# Patient Record
Sex: FEMALE | Race: WHITE | Employment: FULL TIME | URBAN - METROPOLITAN AREA
[De-identification: names, ages, dates, MRNs, and addresses within clinical notes are randomized per-mention and may not be internally consistent; named-entity substitution may affect disease eponyms.]

---

## 2017-04-13 RX ORDER — MINOCYCLINE HYDROCHLORIDE 100 MG/1
CAPSULE ORAL
Qty: 60 CAPSULE | Refills: 0 | Status: SHIPPED | OUTPATIENT
Start: 2017-04-13 | End: 2017-07-26 | Stop reason: SDUPTHER

## 2017-07-05 RX ORDER — ORPHENADRINE CITRATE 100 MG/1
TABLET, EXTENDED RELEASE ORAL
Qty: 30 TABLET | Refills: 0 | Status: SHIPPED | OUTPATIENT
Start: 2017-07-05 | End: 2017-12-04 | Stop reason: SDUPTHER

## 2017-07-26 RX ORDER — MINOCYCLINE HYDROCHLORIDE 100 MG/1
CAPSULE ORAL
Qty: 60 CAPSULE | Refills: 0 | Status: SHIPPED | OUTPATIENT
Start: 2017-07-26 | End: 2017-12-08 | Stop reason: SDUPTHER

## 2017-12-05 RX ORDER — ORPHENADRINE CITRATE 100 MG/1
TABLET, EXTENDED RELEASE ORAL
Qty: 30 TABLET | Refills: 0 | Status: SHIPPED | OUTPATIENT
Start: 2017-12-05 | End: 2017-12-22 | Stop reason: CLARIF

## 2017-12-07 DIAGNOSIS — M54.2 CERVICAL PAIN: ICD-10-CM

## 2017-12-07 RX ORDER — NAPROXEN 500 MG/1
500 TABLET ORAL 2 TIMES DAILY WITH MEALS
Qty: 60 TABLET | Refills: 2 | Status: SHIPPED | OUTPATIENT
Start: 2017-12-07 | End: 2020-10-01

## 2017-12-07 NOTE — TELEPHONE ENCOUNTER
12/07/2017   Izzy Hauser called requesting a refill on the following medications:  Requested Prescriptions     Pending Prescriptions Disp Refills    naproxen (NAPROSYN) 500 MG tablet 60 tablet 2     Sig: Take 1 tablet by mouth 2 times daily (with meals)     Pharmacy verified:  [email or web address]      Date of last visit: 03/31/2016  Date of next visit (if applicable): 56/47/0220

## 2017-12-08 RX ORDER — MINOCYCLINE HYDROCHLORIDE 100 MG/1
100 CAPSULE ORAL 2 TIMES DAILY
Qty: 60 CAPSULE | Refills: 0 | Status: SHIPPED | OUTPATIENT
Start: 2017-12-08 | End: 2018-05-15 | Stop reason: SDUPTHER

## 2017-12-22 ENCOUNTER — OFFICE VISIT (OUTPATIENT)
Dept: FAMILY MEDICINE CLINIC | Age: 42
End: 2017-12-22
Payer: COMMERCIAL

## 2017-12-22 VITALS
DIASTOLIC BLOOD PRESSURE: 86 MMHG | RESPIRATION RATE: 20 BRPM | BODY MASS INDEX: 35.02 KG/M2 | SYSTOLIC BLOOD PRESSURE: 126 MMHG | HEART RATE: 80 BPM | HEIGHT: 67 IN | WEIGHT: 223.1 LBS

## 2017-12-22 DIAGNOSIS — M54.2 CERVICAL PAIN: ICD-10-CM

## 2017-12-22 DIAGNOSIS — E66.9 OBESITY (BMI 30-39.9): Primary | ICD-10-CM

## 2017-12-22 PROCEDURE — 99213 OFFICE O/P EST LOW 20 MIN: CPT | Performed by: FAMILY MEDICINE

## 2017-12-22 RX ORDER — PHENTERMINE HYDROCHLORIDE 37.5 MG/1
37.5 CAPSULE ORAL EVERY MORNING
Qty: 30 CAPSULE | Refills: 0 | Status: SHIPPED | OUTPATIENT
Start: 2017-12-22 | End: 2018-01-21

## 2017-12-22 RX ORDER — CYCLOBENZAPRINE HCL 10 MG
10 TABLET ORAL NIGHTLY
Qty: 60 TABLET | Refills: 0 | Status: SHIPPED | OUTPATIENT
Start: 2017-12-22 | End: 2017-12-27 | Stop reason: SDUPTHER

## 2017-12-22 ASSESSMENT — PATIENT HEALTH QUESTIONNAIRE - PHQ9
2. FEELING DOWN, DEPRESSED OR HOPELESS: 0
SUM OF ALL RESPONSES TO PHQ9 QUESTIONS 1 & 2: 0
1. LITTLE INTEREST OR PLEASURE IN DOING THINGS: 0
SUM OF ALL RESPONSES TO PHQ QUESTIONS 1-9: 0

## 2017-12-22 ASSESSMENT — ENCOUNTER SYMPTOMS: RESPIRATORY NEGATIVE: 1

## 2017-12-22 NOTE — PROGRESS NOTES
Subjective:      Patient ID: Scarlett Ballard is a 40 y.o. female. HPI  Encounter Diagnoses   Name Primary?  Obesity (BMI 30-39. 9) Yes    Cervical pain-strain      Patient is here to restart Adipex which she has not been on for over a year. He has gained weight over the last 4 months as she is been caring for her father who developed stage IV melanoma. He is now doing better and she is interested in getting back to the gym and starting her workout and going on a campaign to lose weight again. Wt Readings from Last 3 Encounters:   17 223 lb 1.6 oz (101.2 kg)   16 196 lb 4.8 oz (89 kg)   16 203 lb (92.1 kg)    Body mass index is 34.94 kg/m². She is also interested in getting a refill of her muscle relaxer which she has been successfully using at night to avoid cervicogenic headaches in the morning. This will be refilled. The rest of this patient's conditions are stable. Past medical and surgical hx reviewed. History reviewed. No pertinent past medical history. Past Surgical History:   Procedure Laterality Date     SECTION  /    DILATION AND CURETTAGE OF UTERUS      ENDOMETRIAL ABLATION      TUBAL LIGATION      VARICOSE VEIN SURGERY  2012     Portions of this note were completed with a voice recording program.  Efforts were made to edit the dictations but occasionally words are mis-transcribed. Review of Systems   Constitutional: Negative. Respiratory: Negative. Cardiovascular: Negative. Negative for palpitations. Musculoskeletal: Positive for neck pain and neck stiffness. Neurological: Negative for dizziness, light-headedness and headaches. All other systems reviewed and are negative. Objective:   Physical Exam   Constitutional: She is oriented to person, place, and time. She appears well-developed and well-nourished. Neck: Muscular tenderness present. No spinous process tenderness present. Decreased range of motion present. Cardiovascular: Normal rate, regular rhythm, S1 normal, S2 normal, normal heart sounds and intact distal pulses. No extrasystoles are present. Exam reveals no gallop. No murmur heard. Pulmonary/Chest: Effort normal and breath sounds normal.   Musculoskeletal: She exhibits no edema. Neurological: She is alert and oriented to person, place, and time. Skin: Skin is warm and dry. No rash noted. Psychiatric: She has a normal mood and affect. Nursing note and vitals reviewed. Assessment:      1. Obesity (BMI 30-39.9)  phentermine (ADIPEX-P) 37.5 MG capsule   2. Cervical pain-strain  cyclobenzaprine (FLEXERIL) 10 MG tablet           Plan:      No orders of the defined types were placed in this encounter. Medications Discontinued During This Encounter   Medication Reason    loratadine (CLARITIN) 10 MG tablet Therapy completed    orphenadrine (NORFLEX) 100 MG extended release tablet Formulary change     Current Outpatient Prescriptions   Medication Sig Dispense Refill    phentermine (ADIPEX-P) 37.5 MG capsule Take 1 capsule by mouth every morning . 30 capsule 0    cyclobenzaprine (FLEXERIL) 10 MG tablet Take 1 tablet by mouth nightly for 10 days 60 tablet 0    minocycline (MINOCIN;DYNACIN) 100 MG capsule Take 1 capsule by mouth 2 times daily 60 capsule 0    naproxen (NAPROSYN) 500 MG tablet Take 1 tablet by mouth 2 times daily (with meals) 60 tablet 2    BP GEL 5 % gel APPLY EXTERNALLY TO THE AFFECTED AREA EVERY NIGHT AT BEDTIME 60 g 0     No current facility-administered medications for this visit. Start Adipex Jan.2, 2018. Track home weights. Appointment end of Jan, 2018.

## 2017-12-22 NOTE — PROGRESS NOTES
Visit Information    Have you changed or started any medications since your last visit including any over-the-counter medicines, vitamins, or herbal medicines? no   Are you having any side effects from any of your medications? -  no  Have you stopped taking any of your medications? Is so, why? -  yes - tx complete    Have you seen any other physician or provider since your last visit? No  Have you had any other diagnostic tests since your last visit? No  Have you been seen in the emergency room and/or had an admission to a hospital since we last saw you? No  Have you had your routine dental cleaning in the past 6 months? yes - 9/20/17    Have you activated your [employer] account? If not, what are your barriers?  No: pt choice     Patient Care Team:  Jluis Garcia MD as PCP - General (Family Medicine)  Jluis Garcia MD as PCP - [facility] Attributed Provider    Medical History Review  Past Medical, Family, and Social History reviewed and does contribute to the patient presenting condition    Health Maintenance   Topic Date Due    HIV screen  12/03/1991    DTaP/Tdap/Td vaccine (1 - Tdap) 12/03/1995    Lipid screen  12/03/2016    Diabetes screen  12/03/2016    Flu vaccine (1) 09/01/2017    Cervical cancer screen  03/13/2018

## 2017-12-27 DIAGNOSIS — M54.2 CERVICAL PAIN: ICD-10-CM

## 2017-12-27 RX ORDER — CYCLOBENZAPRINE HCL 10 MG
10 TABLET ORAL NIGHTLY
Qty: 60 TABLET | Refills: 0
Start: 2017-12-27 | End: 2020-10-01

## 2018-01-29 ENCOUNTER — OFFICE VISIT (OUTPATIENT)
Dept: FAMILY MEDICINE CLINIC | Age: 43
End: 2018-01-29
Payer: COMMERCIAL

## 2018-01-29 VITALS
OXYGEN SATURATION: 98 % | BODY MASS INDEX: 34.2 KG/M2 | HEART RATE: 68 BPM | HEIGHT: 67 IN | DIASTOLIC BLOOD PRESSURE: 82 MMHG | WEIGHT: 217.9 LBS | RESPIRATION RATE: 14 BRPM | SYSTOLIC BLOOD PRESSURE: 134 MMHG

## 2018-01-29 DIAGNOSIS — E66.9 OBESITY (BMI 30-39.9): ICD-10-CM

## 2018-01-29 DIAGNOSIS — M47.892 OTHER OSTEOARTHRITIS OF SPINE, CERVICAL REGION: Primary | ICD-10-CM

## 2018-01-29 PROCEDURE — 1036F TOBACCO NON-USER: CPT | Performed by: FAMILY MEDICINE

## 2018-01-29 PROCEDURE — G8484 FLU IMMUNIZE NO ADMIN: HCPCS | Performed by: FAMILY MEDICINE

## 2018-01-29 PROCEDURE — G8427 DOCREV CUR MEDS BY ELIG CLIN: HCPCS | Performed by: FAMILY MEDICINE

## 2018-01-29 PROCEDURE — 99213 OFFICE O/P EST LOW 20 MIN: CPT | Performed by: FAMILY MEDICINE

## 2018-01-29 PROCEDURE — G8417 CALC BMI ABV UP PARAM F/U: HCPCS | Performed by: FAMILY MEDICINE

## 2018-01-29 RX ORDER — PHENTERMINE HYDROCHLORIDE 37.5 MG/1
37.5 TABLET ORAL
Qty: 30 TABLET | Refills: 0 | Status: SHIPPED | OUTPATIENT
Start: 2018-01-29 | End: 2018-02-28

## 2018-01-29 RX ORDER — PHENTERMINE HYDROCHLORIDE 37.5 MG/1
37.5 TABLET ORAL
COMMUNITY
End: 2018-01-29 | Stop reason: SDUPTHER

## 2018-01-29 ASSESSMENT — ENCOUNTER SYMPTOMS: RESPIRATORY NEGATIVE: 1

## 2018-01-29 NOTE — PROGRESS NOTES
Visit Information    Have you changed or started any medications since your last visit including any over-the-counter medicines, vitamins, or herbal medicines? no   Have you stopped taking any of your medications? Is so, why? -  no  Are you having any side effects from any of your medications? - no    Have you seen any other physician or provider since your last visit?  no   Have you had any other diagnostic tests since your last visit?  no   Have you been seen in the emergency room and/or had an admission in a hospital since we last saw you?  no   Have you had your routine dental cleaning in the past 6 months?  yes - MARLENE FONTANA II.[facility] dental     Do you have an active MyChart account? If no, what is the barrier?   No:  Pt refused    Patient Care Team:  Gretchen Chapin MD as PCP - General (Family Medicine)  Gretchen Chapin MD as PCP - [facility] Attributed Provider    Medical History Review  Past Medical, Family, and Social History reviewed and does not contribute to the patient presenting condition    Health Maintenance   Topic Date Due    HIV screen  12/03/1991    DTaP/Tdap/Td vaccine (1 - Tdap) 12/03/1995    Lipid screen  12/03/2016    Diabetes screen  12/03/2016    Flu vaccine (1) 09/01/2017    Cervical cancer screen  03/13/2018

## 2018-01-29 NOTE — PATIENT INSTRUCTIONS
.You may receive a survey about your visit with us today. The feedback from our patients helps us identify what is working well and where the service to all patients can be enhanced. Thank you! Continue Adipex for weight loss. Recheck in 1 month.

## 2018-05-15 RX ORDER — MINOCYCLINE HYDROCHLORIDE 100 MG/1
100 CAPSULE ORAL 2 TIMES DAILY
Qty: 60 CAPSULE | Refills: 2 | Status: SHIPPED | OUTPATIENT
Start: 2018-05-15 | End: 2019-05-30 | Stop reason: SDUPTHER

## 2019-05-31 RX ORDER — MINOCYCLINE HYDROCHLORIDE 100 MG/1
CAPSULE ORAL
Qty: 60 CAPSULE | Refills: 1 | Status: SHIPPED | OUTPATIENT
Start: 2019-05-31 | End: 2020-06-08 | Stop reason: SDUPTHER

## 2020-01-02 RX ORDER — ORPHENADRINE CITRATE 100 MG/1
100 TABLET, EXTENDED RELEASE ORAL 2 TIMES DAILY PRN
Qty: 30 TABLET | Refills: 0 | Status: SHIPPED | OUTPATIENT
Start: 2020-01-02 | End: 2020-06-08

## 2020-02-18 ENCOUNTER — HOSPITAL ENCOUNTER (OUTPATIENT)
Dept: WOMENS IMAGING | Age: 45
Discharge: HOME | End: 2020-02-18
Payer: COMMERCIAL

## 2020-02-18 PROCEDURE — 77063 BREAST TOMOSYNTHESIS BI: CPT

## 2020-06-08 RX ORDER — ORPHENADRINE CITRATE 100 MG/1
TABLET, EXTENDED RELEASE ORAL
Qty: 30 TABLET | Refills: 0 | Status: SHIPPED | OUTPATIENT
Start: 2020-06-08 | End: 2020-06-10

## 2020-06-08 RX ORDER — MINOCYCLINE HYDROCHLORIDE 100 MG/1
100 CAPSULE ORAL 2 TIMES DAILY
Qty: 60 CAPSULE | Refills: 3 | Status: SHIPPED | OUTPATIENT
Start: 2020-06-08

## 2020-06-10 RX ORDER — ORPHENADRINE CITRATE 100 MG/1
TABLET, EXTENDED RELEASE ORAL
Qty: 30 TABLET | Refills: 0 | Status: SHIPPED | OUTPATIENT
Start: 2020-06-10 | End: 2020-10-01

## 2020-10-01 ENCOUNTER — TELEPHONE (OUTPATIENT)
Dept: FAMILY MEDICINE CLINIC | Age: 45
End: 2020-10-01

## 2020-10-01 ENCOUNTER — OFFICE VISIT (OUTPATIENT)
Dept: FAMILY MEDICINE CLINIC | Age: 45
End: 2020-10-01
Payer: COMMERCIAL

## 2020-10-01 VITALS
BODY MASS INDEX: 35.71 KG/M2 | HEART RATE: 66 BPM | WEIGHT: 227.5 LBS | SYSTOLIC BLOOD PRESSURE: 116 MMHG | TEMPERATURE: 97.2 F | HEIGHT: 67 IN | RESPIRATION RATE: 12 BRPM | DIASTOLIC BLOOD PRESSURE: 68 MMHG

## 2020-10-01 PROCEDURE — G8427 DOCREV CUR MEDS BY ELIG CLIN: HCPCS | Performed by: FAMILY MEDICINE

## 2020-10-01 PROCEDURE — G8484 FLU IMMUNIZE NO ADMIN: HCPCS | Performed by: FAMILY MEDICINE

## 2020-10-01 PROCEDURE — G8417 CALC BMI ABV UP PARAM F/U: HCPCS | Performed by: FAMILY MEDICINE

## 2020-10-01 PROCEDURE — 1036F TOBACCO NON-USER: CPT | Performed by: FAMILY MEDICINE

## 2020-10-01 PROCEDURE — 99213 OFFICE O/P EST LOW 20 MIN: CPT | Performed by: FAMILY MEDICINE

## 2020-10-01 RX ORDER — PHENTERMINE HYDROCHLORIDE 37.5 MG/1
37.5 TABLET ORAL
Qty: 30 TABLET | Refills: 0 | Status: SHIPPED | OUTPATIENT
Start: 2020-10-01 | End: 2020-10-29 | Stop reason: SDUPTHER

## 2020-10-01 RX ORDER — LORATADINE 10 MG/1
10 TABLET ORAL DAILY
Qty: 90 TABLET | Refills: 1 | Status: SHIPPED | OUTPATIENT
Start: 2020-10-01

## 2020-10-01 SDOH — ECONOMIC STABILITY: FOOD INSECURITY: WITHIN THE PAST 12 MONTHS, YOU WORRIED THAT YOUR FOOD WOULD RUN OUT BEFORE YOU GOT MONEY TO BUY MORE.: NEVER TRUE

## 2020-10-01 SDOH — ECONOMIC STABILITY: FOOD INSECURITY: WITHIN THE PAST 12 MONTHS, THE FOOD YOU BOUGHT JUST DIDN'T LAST AND YOU DIDN'T HAVE MONEY TO GET MORE.: NEVER TRUE

## 2020-10-01 SDOH — ECONOMIC STABILITY: TRANSPORTATION INSECURITY
IN THE PAST 12 MONTHS, HAS LACK OF TRANSPORTATION KEPT YOU FROM MEETINGS, WORK, OR FROM GETTING THINGS NEEDED FOR DAILY LIVING?: NO

## 2020-10-01 SDOH — ECONOMIC STABILITY: INCOME INSECURITY: HOW HARD IS IT FOR YOU TO PAY FOR THE VERY BASICS LIKE FOOD, HOUSING, MEDICAL CARE, AND HEATING?: NOT HARD AT ALL

## 2020-10-01 SDOH — ECONOMIC STABILITY: TRANSPORTATION INSECURITY
IN THE PAST 12 MONTHS, HAS THE LACK OF TRANSPORTATION KEPT YOU FROM MEDICAL APPOINTMENTS OR FROM GETTING MEDICATIONS?: NO

## 2020-10-01 ASSESSMENT — ENCOUNTER SYMPTOMS
GASTROINTESTINAL NEGATIVE: 1
RESPIRATORY NEGATIVE: 1

## 2020-10-01 NOTE — TELEPHONE ENCOUNTER
Pt called office stating she was just here for an appt today and forgot to ask for a refill of Claritin to [facility]. If no call back, she will check with the pharmacy after 2pm. Refill if appropriae.

## 2020-10-01 NOTE — PROGRESS NOTES
Subjective:      Patient ID: Bailee Kilgore is a 38 y.o. female. HPI:    Chief Complaint   Patient presents with    Weight Loss     Pt here to discuss weight loss. Pt has questions regarding weight loss and bariatric surgery vs nutrition. Pt has done well on Adipex in the past, last taken in 2018. Trouble is maintaining the weight. Wt Readings from Last 3 Encounters:   10/01/20 227 lb 8 oz (103.2 kg)   18 217 lb 14.4 oz (98.8 kg)   17 223 lb 1.6 oz (101.2 kg)     Has tried multiple diet plan with no good results. She does exercise on regular basis and drinks nothing but water. Patient Active Problem List   Diagnosis    Acne    Cervical pain-strain    Depression-stable    Over weight    Varicose veins of both lower extremities with pain, lt>rt.  Osteoarthritis cervical spine    Obesity (BMI 30-39. 9)    Medication monitoring encounter    Allergic rhinitis    Menorrhagia    Otitis media, left     Past Surgical History:   Procedure Laterality Date     SECTION      DILATION AND CURETTAGE OF UTERUS      ENDOMETRIAL ABLATION      TUBAL LIGATION      VARICOSE VEIN SURGERY  2012     Prior to Admission medications    Medication Sig Start Date End Date Taking? Authorizing Provider   phentermine (ADIPEX-P) 37.5 MG tablet Take 1 tablet by mouth every morning (before breakfast) for 30 days. 10/1/20 10/31/20 Yes Mary Cobos,    minocycline (MINOCIN;DYNACIN) 100 MG capsule Take 1 capsule by mouth 2 times daily 20  Yes Nivia Strange MD         Review of Systems   Constitutional: Negative. HENT: Negative. Respiratory: Negative. Cardiovascular: Negative. Gastrointestinal: Negative. Musculoskeletal: Negative. All other systems reviewed and are negative. Objective:   Physical Exam  Vitals signs and nursing note reviewed. Constitutional:       Appearance: She is well-developed.    HENT:      Head: Normocephalic and atraumatic. Right Ear: Tympanic membrane normal.      Left Ear: Tympanic membrane normal.   Cardiovascular:      Rate and Rhythm: Normal rate and regular rhythm. Heart sounds: Normal heart sounds. No murmur. Pulmonary:      Effort: Pulmonary effort is normal.      Breath sounds: Normal breath sounds. Abdominal:      General: Bowel sounds are normal.      Palpations: Abdomen is soft. Skin:     General: Skin is warm and dry. Neurological:      Mental Status: She is alert and oriented to person, place, and time. Psychiatric:         Behavior: Behavior normal.         Assessment:       Diagnosis Orders   1.  Obesity (BMI 30-39.9)  phentermine (ADIPEX-P) 37.5 MG tablet    [facility]. [facility] - Dietitian           Plan:      -  Orders above  -  Risks/benefits of Adipex discussed  -  RTO 1 month        Mundo Farias DO

## 2020-10-12 ENCOUNTER — OFFICE VISIT (OUTPATIENT)
Dept: INTERNAL MEDICINE CLINIC | Age: 45
End: 2020-10-12
Payer: COMMERCIAL

## 2020-10-12 VITALS — TEMPERATURE: 93.7 F | HEIGHT: 67 IN | WEIGHT: 222.2 LBS | BODY MASS INDEX: 34.88 KG/M2

## 2020-10-12 PROCEDURE — 97802 MEDICAL NUTRITION INDIV IN: CPT | Performed by: DIETITIAN, REGISTERED

## 2020-10-12 NOTE — PROGRESS NOTES
[address] [place] [address]. [address] [place] [address]., [facility], [facility]  [phone] (phone)  [phone] (fax)    Patient Name: Siri Cooper. Date of Birth: [birth year]. MRN: [id number]      Assessment: Patient is a 38 y.o. female seen for Initial MNT visit for Obesity.     -Nutritionally relevant labs: No results found for: LABA1C, GLUCOSE, CHOL, HDL, LDLCALC, TRIG    - Work out: Started back to working out. Tries to work out daily. Cardio 30 min/ machines and free weights. - Currently on Adipex: 5 th time. Looses weight and then gains weigh back. Pt states she wants to know exactly how much to eat per meal.     -Food recall: Low appetite with Adipex  Breakfast: yogurt/shake  Lunch:    Afternoon snack: apple  Dinner: normal dinner  Snacks: beef jerkey/apple    -Main Beverages: water/crystal light  -  Current Outpatient Medications on File Prior to Visit   Medication Sig Dispense Refill    phentermine (ADIPEX-P) 37.5 MG tablet Take 1 tablet by mouth every morning (before breakfast) for 30 days. 30 tablet 0    loratadine (CLARITIN) 10 MG tablet Take 1 tablet by mouth daily 90 tablet 1    minocycline (MINOCIN;DYNACIN) 100 MG capsule Take 1 capsule by mouth 2 times daily 60 capsule 3     No current facility-administered medications on file prior to visit. Vitals from current and previous visits:  Temp 93.7 °F (34.3 °C)   Ht 5' 7\" (1.702 m)   Wt 222 lb 3.2 oz (100.8 kg)   BMI 34.80 kg/m²     -Body mass index is 34.8 kg/m². 30-34.9 - Obesity Grade I.     Nutrition Diagnosis:   Food and nutrition-related knowledge deficit related to Lack of previous MNT/currently undergoing MNT as evidenced by Food recall. Intervention:  -Impression: Nicki Gould seems willing to make necessary diary changes but was requesting very detailed food logs to know exactly what to eat. Nicki Gould admits she struggles with knowing how much to eat and how often.  She admits if she does not exercise she will feel like she failed and et poorly. She currently snacks or eats light at breakfast or lunch and then normal meal at supper. At times will allow extra carb/fat at meals since she has not ate most the day. -Instructed the patient on: Plate Method, carb counting, encouraged 2 meals per day plus 1-2 snacks. Encouraged healthy relationship with foods. 80/20 rule to healthy and poor foods choices.      -Handouts given for: carbohydrate counting, food logging, plate method, recipies and sample meal plans/menus.    -Nutrition prescription: 1200 -4075-4757 calories per day. Adipex 1200, after Adipex 7097-0995 if working out. Comprehension verified using teachback method. Monitoring/Evaluation:   -Followup visit: 6 weeks with dietitian.   -Receptiveness to education/goals: Agreeable.  -Evaluation of education: Indicates understanding.  -Readiness to change: action - ready to set action plan and implement dietary changes. -Expected compliance: fair. Thank you for your referral of this patient. Total time involved in direct patient education: 60 minutes for initial MNT visit.

## 2020-10-29 ENCOUNTER — OFFICE VISIT (OUTPATIENT)
Dept: FAMILY MEDICINE CLINIC | Age: 45
End: 2020-10-29
Payer: COMMERCIAL

## 2020-10-29 VITALS
HEART RATE: 72 BPM | DIASTOLIC BLOOD PRESSURE: 68 MMHG | SYSTOLIC BLOOD PRESSURE: 122 MMHG | WEIGHT: 210 LBS | BODY MASS INDEX: 32.96 KG/M2 | RESPIRATION RATE: 12 BRPM | TEMPERATURE: 97 F | HEIGHT: 67 IN

## 2020-10-29 PROCEDURE — G8427 DOCREV CUR MEDS BY ELIG CLIN: HCPCS | Performed by: FAMILY MEDICINE

## 2020-10-29 PROCEDURE — 99213 OFFICE O/P EST LOW 20 MIN: CPT | Performed by: FAMILY MEDICINE

## 2020-10-29 RX ORDER — PHENTERMINE HYDROCHLORIDE 37.5 MG/1
37.5 TABLET ORAL
Qty: 30 TABLET | Refills: 0 | Status: SHIPPED | OUTPATIENT
Start: 2020-10-29 | End: 2020-11-28

## 2020-10-29 ASSESSMENT — ENCOUNTER SYMPTOMS
GASTROINTESTINAL NEGATIVE: 1
RESPIRATORY NEGATIVE: 1

## 2020-10-29 NOTE — PROGRESS NOTES
Subjective:      Patient ID: Kamila Couch is a 38 y.o. female. HPI:    Chief Complaint   Patient presents with    1 Month Follow-Up     Adipex #1, down 17lbs     Pt is down 17 lbs after month #1. She is meeting with a dietician. Wt Readings from Last 3 Encounters:   10/29/20 210 lb (95.3 kg)   10/12/20 222 lb 3.2 oz (100.8 kg)   10/01/20 227 lb 8 oz (103.2 kg)     Tolerating medication fine. Patient Active Problem List   Diagnosis    Acne    Cervical pain-strain    Depression-stable    Over weight    Varicose veins of both lower extremities with pain, lt>rt.  Osteoarthritis cervical spine    Obesity (BMI 30-39. 9)    Medication monitoring encounter    Allergic rhinitis    Menorrhagia    Otitis media, left     Past Surgical History:   Procedure Laterality Date     SECTION      DILATION AND CURETTAGE OF UTERUS      ENDOMETRIAL ABLATION      TUBAL LIGATION      VARICOSE VEIN SURGERY  2012     Prior to Admission medications    Medication Sig Start Date End Date Taking? Authorizing Provider   phentermine (ADIPEX-P) 37.5 MG tablet Take 1 tablet by mouth every morning (before breakfast) for 30 days. 10/1/20 10/31/20 Yes Paul Jim,    loratadine (CLARITIN) 10 MG tablet Take 1 tablet by mouth daily 10/1/20  Yes Lizabeth Mercado MD   minocycline (MINOCIN;DYNACIN) 100 MG capsule Take 1 capsule by mouth 2 times daily 20  Yes Lizabeth Mercado MD         Review of Systems   Constitutional: Negative. HENT: Negative. Respiratory: Negative. Cardiovascular: Negative. Gastrointestinal: Negative. Musculoskeletal: Negative. All other systems reviewed and are negative. Objective:   Physical Exam  Vitals signs and nursing note reviewed. Constitutional:       General: She is not in acute distress. Appearance: Normal appearance. She is well-developed. HENT:      Head: Normocephalic and atraumatic.       Right Ear: Tympanic membrane normal. Left Ear: Tympanic membrane normal.   Eyes:      Conjunctiva/sclera: Conjunctivae normal.   Neck:      Musculoskeletal: Neck supple. Cardiovascular:      Rate and Rhythm: Normal rate and regular rhythm. Heart sounds: Normal heart sounds. No murmur. Pulmonary:      Effort: Pulmonary effort is normal.      Breath sounds: Normal breath sounds. No wheezing, rhonchi or rales. Abdominal:      General: There is no distension. Skin:     General: Skin is warm and dry. Findings: No rash (on exposed surfaces). Neurological:      General: No focal deficit present. Mental Status: She is alert. Psychiatric:         Attention and Perception: Attention normal.         Mood and Affect: Mood normal.         Speech: Speech normal.         Behavior: Behavior normal. Behavior is cooperative. Thought Content: Thought content normal.         Judgment: Judgment normal.         Assessment:       Diagnosis Orders   1.  Obesity (BMI 30-39.9)  phentermine (ADIPEX-P) 37.5 MG tablet           Plan:      -  Doing well, down 17 lbs after 1 month  -  Refill sent  -  RTO 1 month        Dora Phan, DO

## 2020-10-29 NOTE — PROGRESS NOTES
Visit Information    Have you changed or started any medications since your last visit including any over-the-counter medicines, vitamins, or herbal medicines? no   Are you having any side effects from any of your medications? -  no  Have you stopped taking any of your medications? Is so, why? -  no    Have you seen any other physician or provider since your last visit? No  Have you had any other diagnostic tests since your last visit? No  Have you been seen in the emergency room and/or had an admission to a hospital since we last saw you? No  Have you had your routine dental cleaning in the past 6 months? yes - 8/2020    Have you activated your [employer] account? If not, what are your barriers?  No: declines     Patient Care Team:  Vinny Bazzi MD as PCP - General (Family Medicine)  Vinny Bazzi MD as PCP - [facility]    Medical History Review  Past Medical, Family, and Social History reviewed and does contribute to the patient presenting condition    Health Maintenance   Topic Date Due    HIV screen  12/03/1991    DTaP/Tdap/Td vaccine (1 - Tdap) 12/03/1995    Lipid screen  12/03/2016    Diabetes screen  12/03/2016    Cervical cancer screen  03/13/2018    Flu vaccine (1) 09/01/2020    Hepatitis A vaccine  Aged Out    Hepatitis B vaccine  Aged Out    Hib vaccine  Aged Out    Meningococcal (ACWY) vaccine  Aged Out    Pneumococcal 0-64 years Vaccine  Aged Out

## 2020-11-25 ENCOUNTER — OFFICE VISIT (OUTPATIENT)
Dept: FAMILY MEDICINE CLINIC | Age: 45
End: 2020-11-25
Payer: COMMERCIAL

## 2020-11-25 VITALS
BODY MASS INDEX: 31.01 KG/M2 | SYSTOLIC BLOOD PRESSURE: 126 MMHG | TEMPERATURE: 95 F | DIASTOLIC BLOOD PRESSURE: 76 MMHG | WEIGHT: 198 LBS | HEART RATE: 76 BPM | RESPIRATION RATE: 16 BRPM

## 2020-11-25 PROCEDURE — G8427 DOCREV CUR MEDS BY ELIG CLIN: HCPCS | Performed by: FAMILY MEDICINE

## 2020-11-25 PROCEDURE — 99213 OFFICE O/P EST LOW 20 MIN: CPT | Performed by: FAMILY MEDICINE

## 2020-11-25 PROCEDURE — G8484 FLU IMMUNIZE NO ADMIN: HCPCS | Performed by: FAMILY MEDICINE

## 2020-11-25 PROCEDURE — 1036F TOBACCO NON-USER: CPT | Performed by: FAMILY MEDICINE

## 2020-11-25 PROCEDURE — G8417 CALC BMI ABV UP PARAM F/U: HCPCS | Performed by: FAMILY MEDICINE

## 2020-11-25 RX ORDER — PHENTERMINE HYDROCHLORIDE 37.5 MG/1
37.5 TABLET ORAL
Qty: 30 TABLET | Refills: 0 | Status: SHIPPED | OUTPATIENT
Start: 2020-11-25 | End: 2020-12-25

## 2020-11-25 ASSESSMENT — ENCOUNTER SYMPTOMS
GASTROINTESTINAL NEGATIVE: 1
RESPIRATORY NEGATIVE: 1

## 2020-11-25 NOTE — PROGRESS NOTES
Subjective:      Patient ID: Renan Anton is a 38 y.o. female. HPI:    Chief Complaint   Patient presents with    1 Month Follow-Up     Adipex     1 month eval.  Down another 12 lbs since last visit, total of 29 lbs after 2 mos. Max weight 227 lbs. Wt Readings from Last 3 Encounters:   20 198 lb (89.8 kg)   10/29/20 210 lb (95.3 kg)   10/12/20 222 lb 3.2 oz (100.8 kg)     She started a fitness challenge at [employer] Tolerating medication fine. Patient Active Problem List   Diagnosis    Acne    Cervical pain-strain    Depression-stable    Over weight    Varicose veins of both lower extremities with pain, lt>rt.  Osteoarthritis cervical spine    Obesity (BMI 30-39. 9)    Medication monitoring encounter    Allergic rhinitis    Menorrhagia    Otitis media, left     Past Surgical History:   Procedure Laterality Date     SECTION      DILATION AND CURETTAGE OF UTERUS      ENDOMETRIAL ABLATION      TUBAL LIGATION      VARICOSE VEIN SURGERY  2012     Prior to Admission medications    Medication Sig Start Date End Date Taking? Authorizing Provider   phentermine (ADIPEX-P) 37.5 MG tablet Take 1 tablet by mouth every morning (before breakfast) for 30 days. 10/29/20 11/28/20 Yes Francy Mi, DO   loratadine (CLARITIN) 10 MG tablet Take 1 tablet by mouth daily 10/1/20  Yes Raynell Gottron, MD   minocycline (MINOCIN;DYNACIN) 100 MG capsule Take 1 capsule by mouth 2 times daily 20  Yes Raynell Gottron, MD         Review of Systems   Constitutional: Negative. HENT: Negative. Respiratory: Negative. Cardiovascular: Negative. Gastrointestinal: Negative. Musculoskeletal: Negative. All other systems reviewed and are negative. Objective:   Physical Exam  Vitals signs and nursing note reviewed. Constitutional:       General: She is not in acute distress. Appearance: Normal appearance. She is well-developed.    HENT:      Head: Normocephalic and atraumatic. Right Ear: Tympanic membrane normal.      Left Ear: Tympanic membrane normal.   Eyes:      Conjunctiva/sclera: Conjunctivae normal.   Neck:      Musculoskeletal: Neck supple. Cardiovascular:      Rate and Rhythm: Normal rate and regular rhythm. Heart sounds: Normal heart sounds. No murmur. Pulmonary:      Effort: Pulmonary effort is normal.      Breath sounds: Normal breath sounds. No wheezing, rhonchi or rales. Abdominal:      General: There is no distension. Skin:     General: Skin is warm and dry. Findings: No rash (on exposed surfaces). Neurological:      General: No focal deficit present. Mental Status: She is alert. Psychiatric:         Attention and Perception: Attention normal.         Mood and Affect: Mood normal.         Speech: Speech normal.         Behavior: Behavior normal. Behavior is cooperative. Thought Content: Thought content normal.         Judgment: Judgment normal.         Assessment:       Diagnosis Orders   1.  Obesity (BMI 30-39.9)  phentermine (ADIPEX-P) 37.5 MG tablet           Plan:      -  Pt down 29 lbs after 2 mos, last refill sent  -  RTO bushra Gonsalez DO

## 2020-11-25 NOTE — PROGRESS NOTES
Visit Information    Have you changed or started any medications since your last visit including any over-the-counter medicines, vitamins, or herbal medicines? no   Are you having any side effects from any of your medications? -  no  Have you stopped taking any of your medications? Is so, why? -  no    Have you seen any other physician or provider since your last visit? No  Have you had any other diagnostic tests since your last visit? No  Have you been seen in the emergency room and/or had an admission to a hospital since we last saw you? No  Have you had your routine dental cleaning in the past 6 months? yes - 7/2020    Have you activated your [employer] account? If not, what are your barriers?  yes    Patient Care Team:  Marlyn Galeas MD as PCP - General (Family Medicine)  Marlyn Galeas MD as PCP - [facility]    Medical History Review  Past Medical, Family, and Social History reviewed and does contribute to the patient presenting condition    Health Maintenance   Topic Date Due    HIV screen  12/03/1991    DTaP/Tdap/Td vaccine (1 - Tdap) 12/03/1995    Lipid screen  12/03/2016    Diabetes screen  12/03/2016    Cervical cancer screen  03/13/2018    Flu vaccine (1) 09/01/2020    Hepatitis A vaccine  Aged Out    Hepatitis B vaccine  Aged Out    Hib vaccine  Aged Out    Meningococcal (ACWY) vaccine  Aged Out    Pneumococcal 0-64 years Vaccine  Aged Out

## 2021-04-26 ENCOUNTER — OFFICE VISIT (OUTPATIENT)
Dept: FAMILY MEDICINE CLINIC | Age: 46
End: 2021-04-26
Payer: COMMERCIAL

## 2021-04-26 VITALS
SYSTOLIC BLOOD PRESSURE: 124 MMHG | RESPIRATION RATE: 16 BRPM | WEIGHT: 200.4 LBS | HEART RATE: 80 BPM | DIASTOLIC BLOOD PRESSURE: 72 MMHG | BODY MASS INDEX: 31.39 KG/M2

## 2021-04-26 DIAGNOSIS — L98.7 EXCESS SKIN OF ABDOMEN: ICD-10-CM

## 2021-04-26 DIAGNOSIS — E66.9 OBESITY (BMI 30-39.9): ICD-10-CM

## 2021-04-26 DIAGNOSIS — Z01.818 PRE-OP EVALUATION: Primary | ICD-10-CM

## 2021-04-26 PROCEDURE — G8417 CALC BMI ABV UP PARAM F/U: HCPCS | Performed by: FAMILY MEDICINE

## 2021-04-26 PROCEDURE — 99213 OFFICE O/P EST LOW 20 MIN: CPT | Performed by: FAMILY MEDICINE

## 2021-04-26 PROCEDURE — G8427 DOCREV CUR MEDS BY ELIG CLIN: HCPCS | Performed by: FAMILY MEDICINE

## 2021-04-26 ASSESSMENT — ENCOUNTER SYMPTOMS
GASTROINTESTINAL NEGATIVE: 1
RESPIRATORY NEGATIVE: 1

## 2021-04-26 NOTE — PROGRESS NOTES
Visit Information    Have you changed or started any medications since your last visit including any over-the-counter medicines, vitamins, or herbal medicines? no   Are you having any side effects from any of your medications? -  no  Have you stopped taking any of your medications? Is so, why? -  no    Have you seen any other physician or provider since your last visit? Yes - Records Requested  Have you had any other diagnostic tests since your last visit? No  Have you been seen in the emergency room and/or had an admission to a hospital since we last saw you? No  Have you had your routine dental cleaning in the past 6 months? yes -      Have you activated your [employer] account? If not, what are your barriers?  Yes     Patient Care Team:  Chanda Lyn DO as PCP - General (Family Medicine)  Chanda Lyn DO as PCP [facility]    Medical History Review  Past Medical, Family, and Social History reviewed and does contribute to the patient presenting condition    Health Maintenance   Topic Date Due    Hepatitis C screen  Never done    HIV screen  Never done    COVID-19 Vaccine (1) Never done    DTaP/Tdap/Td vaccine (1 - Tdap) Never done    Lipid screen  Never done    Diabetes screen  Never done    Cervical cancer screen  03/13/2018    Flu vaccine (Season Ended) 09/01/2021    Hepatitis A vaccine  Aged Out    Hepatitis B vaccine  Aged Out    Hib vaccine  Aged Out    Meningococcal (ACWY) vaccine  Aged Out    Pneumococcal 0-64 years Vaccine  Aged Out

## 2021-04-26 NOTE — PROGRESS NOTES
Bob Shaw (:  1975) is a 41 y.o. female,Established patient, here for evaluation of the following chief complaint(s):  Pre-op Exam (Tummy Tuck on 2021 with Dr. Park Aguirre)      SUBJECTIVE/OBJECTIVE:  HPI:    Chief Complaint   Patient presents with    Pre-op Exam     Tummy Tuck on 2021 with Dr. Park Aguirre     Pt scheduled for abdominal surgery and liposuction with Dr. Manoj Mays on . Sera Landon denies CP, chest tightness, SOB. Able to perform 4 METs with no cardiac symptoms. BPs controlled. BP Readings from Last 3 Encounters:   21 124/72   20 126/76   10/29/20 122/68     Denies hx of general anesthesia complications. Patient Active Problem List   Diagnosis    Acne    Cervical pain-strain    Depression-stable    Over weight    Varicose veins of both lower extremities with pain, lt>rt.  Osteoarthritis cervical spine    Obesity (BMI 30-39. 9)    Medication monitoring encounter    Allergic rhinitis    Menorrhagia    Otitis media, left     Past Surgical History:   Procedure Laterality Date     SECTION      DILATION AND CURETTAGE OF UTERUS      ENDOMETRIAL ABLATION      TUBAL LIGATION      VARICOSE VEIN SURGERY  2012     Social History     Tobacco Use    Smoking status: Former Smoker     Packs/day: 0.30     Years: 4.00     Pack years: 1.20     Types: Cigarettes     Quit date: 10/1/2012     Years since quittin.5    Smokeless tobacco: Never Used   Substance Use Topics    Alcohol use: Yes     Comment: rarely    Drug use: No         Review of Systems   Constitutional: Negative. HENT: Negative. Respiratory: Negative. Cardiovascular: Negative. Gastrointestinal: Negative. Musculoskeletal: Negative. All other systems reviewed and are negative. Physical Exam  Vitals signs and nursing note reviewed. Constitutional:       General: She is not in acute distress. Appearance: Normal appearance.  She is well-developed. HENT:      Head: Normocephalic and atraumatic. Right Ear: Tympanic membrane normal.      Left Ear: Tympanic membrane normal.   Eyes:      Conjunctiva/sclera: Conjunctivae normal.   Neck:      Musculoskeletal: Neck supple. Cardiovascular:      Rate and Rhythm: Normal rate and regular rhythm. Heart sounds: Normal heart sounds. No murmur. Pulmonary:      Effort: Pulmonary effort is normal.      Breath sounds: Normal breath sounds. No wheezing, rhonchi or rales. Abdominal:      General: There is no distension. Skin:     General: Skin is warm and dry. Findings: No rash (on exposed surfaces). Neurological:      General: No focal deficit present. Mental Status: She is alert. Psychiatric:         Attention and Perception: Attention normal.         Mood and Affect: Mood normal.         Speech: Speech normal.         Behavior: Behavior normal. Behavior is cooperative. Thought Content: Thought content normal.         Judgment: Judgment normal.       ASSESSMENT/PLAN:  1. Pre-op evaluation  2. Excess skin of abdomen  3. Obesity (BMI 30-39.9)    -  Pt acceptable risk for surgery, copy of this note will be send to Dr. Víctor Hansen    Return for RTO as needed. An electronic signature was used to authenticate this note.     --Willow Olivas, DO

## 2021-05-11 ENCOUNTER — TELEPHONE (OUTPATIENT)
Dept: FAMILY MEDICINE CLINIC | Age: 46
End: 2021-05-11

## 2021-05-11 NOTE — TELEPHONE ENCOUNTER
Pt called office requesting a note just stating she was here for an appt on 4/26/21. Needs faxed to her at [phone]. Please advise.

## 2021-05-11 NOTE — LETTER
[address] [address]  Phone: [phone]  Fax: 30 [address] Jonnie Ballard DO        May 11, 2021     Patient: Spencer Alford   YOB: 1975   Date of Visit: 4/26/21       To Whom It May Concern:    Ada Moore was seen in my office on 4/26/21. If you have any questions or concerns, please don't hesitate to call.     Sincerely,        Nandini Clark, DO

## 2021-06-03 ENCOUNTER — TELEPHONE (OUTPATIENT)
Dept: FAMILY MEDICINE CLINIC | Age: 46
End: 2021-06-03

## 2021-06-03 RX ORDER — DEXTROMETHORPHAN HYDROBROMIDE AND PROMETHAZINE HYDROCHLORIDE 15; 6.25 MG/5ML; MG/5ML
5 SYRUP ORAL 4 TIMES DAILY PRN
Qty: 120 ML | Refills: 0 | Status: SHIPPED | OUTPATIENT
Start: 2021-06-03 | End: 2021-06-09

## 2021-06-03 NOTE — TELEPHONE ENCOUNTER
The patient called in and stated that she had a tummy tuck in [place] on 5/25 and 4 days ago she developed a cough. She stated that they pulled her abd so tight that when she coughs it feels like she is going to \"bust open\". She stated that she has not been able to sleep the past 3 nights due to the cough. States that she has tried OTC Robitussin and it is not helping her. She only gets relief when she has a cough drop in. She denies any other S/S. The patient stated that she called her surgeon and was told to call her PCP. She is requesting something be sent into [facility] for the cough or any other recommendations. If no call back the patient will check with her pharmacy after 2pm today.

## 2022-07-29 ENCOUNTER — HOSPITAL ENCOUNTER (EMERGENCY)
Age: 47
Discharge: HOME | End: 2022-07-29
Payer: COMMERCIAL

## 2022-07-29 VITALS
WEIGHT: 200 LBS | TEMPERATURE: 98.4 F | HEIGHT: 67 IN | DIASTOLIC BLOOD PRESSURE: 96 MMHG | SYSTOLIC BLOOD PRESSURE: 126 MMHG | OXYGEN SATURATION: 98 % | HEART RATE: 81 BPM | BODY MASS INDEX: 31.39 KG/M2 | RESPIRATION RATE: 16 BRPM

## 2022-07-29 DIAGNOSIS — M54.12 CERVICAL RADICULOPATHY: Primary | ICD-10-CM

## 2022-07-29 PROCEDURE — 99213 OFFICE O/P EST LOW 20 MIN: CPT

## 2022-07-29 PROCEDURE — 99202 OFFICE O/P NEW SF 15 MIN: CPT | Performed by: NURSE PRACTITIONER

## 2022-07-29 RX ORDER — METHYLPREDNISOLONE 4 MG/1
TABLET ORAL
Qty: 1 KIT | Refills: 0 | Status: SHIPPED | OUTPATIENT
Start: 2022-07-29 | End: 2022-10-19 | Stop reason: SDUPTHER

## 2022-07-29 RX ORDER — CYCLOBENZAPRINE HCL 10 MG
10 TABLET ORAL NIGHTLY PRN
Qty: 7 TABLET | Refills: 0 | Status: SHIPPED | OUTPATIENT
Start: 2022-07-29 | End: 2022-10-19 | Stop reason: SDUPTHER

## 2022-07-29 RX ORDER — IBUPROFEN 800 MG/1
1300 TABLET ORAL EVERY 6 HOURS PRN
COMMUNITY

## 2022-07-29 ASSESSMENT — PAIN - FUNCTIONAL ASSESSMENT: PAIN_FUNCTIONAL_ASSESSMENT: NONE - DENIES PAIN

## 2022-07-29 NOTE — ED PROVIDER NOTES
[phone]       Chief Complaint   Patient presents with    Tingling     numbness bilateral arms  cant sleep . Seeing chirpractor and  he referred to  come to urgent care for medrol dose         Nurses Notes reviewed and I agree except as noted in the HPI. HISTORY OF PRESENT ILLNESS   Josias Amador is a 40 y.o. female who presents for evaluation. She states that she has chronic numbness and tingling running down both arms for a long time now. She denies any injury or trauma that started the symptoms. She has been following with a chiropractor on and off. Today at her chiropractor appointment it was recommended that she come to the urgent care for a Medrol Dosepak. She has not been followed by orthopedic specialty. The patient/patient representative has no other acute complaints at this time. REVIEW OF SYSTEMS     Review of Systems   Constitutional:  Negative for chills, fatigue and fever. HENT:  Negative for congestion, ear pain, sinus pressure and sore throat. Eyes:  Negative for redness and itching. Respiratory:  Negative for cough, chest tightness and shortness of breath. Cardiovascular:  Negative for chest pain. Gastrointestinal:  Negative for abdominal pain, diarrhea, nausea and vomiting. Musculoskeletal:  Negative for back pain and neck pain. Skin:  Negative for rash. Allergic/Immunologic: Negative for environmental allergies and food allergies. Neurological:  Negative for headaches. Cervical radiculopathy   Hematological:  Negative for adenopathy. PAST MEDICAL HISTORY   History reviewed. No pertinent past medical history. SURGICAL HISTORY     Patient  has a past surgical history that includes  section (//); Varicose vein surgery (2012); Tubal ligation (); Dilation and curettage of uterus (); and Endometrial ablation.     CURRENT MEDICATIONS       Discharge Medication no right CVA tenderness or left CVA tenderness. Musculoskeletal:      Cervical back: Normal range of motion. Comments: Moves all extremities without restriction   Skin:     General: Skin is warm and dry. Findings: No rash. Neurological:      Mental Status: She is alert and oriented to person, place, and time. Psychiatric:         Mood and Affect: Mood normal.         Speech: Speech normal.         Behavior: Behavior normal.       DIAGNOSTIC RESULTS   Labs:  Abnormal Labs Reviewed - No data to display     IMAGING:  No orders to display     URGENT CARE COURSE:     Vitals:    07/29/22 1844   BP: (!) 126/96   Pulse: 81   Resp: 16   Temp: 98.4 °F (36.9 °C)   SpO2: 98%   Weight: 200 lb (90.7 kg)   Height: 5' 7\" (1.702 m)       Medications - No data to display  PROCEDURES:  FINALIMPRESSION      1. Cervical radiculopathy        DISPOSITION/PLAN   DISPOSITION Decision To Discharge 07/29/2022 06:54:51 PM           Problem List Items Addressed This Visit    None  Visit Diagnoses       Cervical radiculopathy    -  Primary    Relevant Medications        methylPREDNISolone (MEDROL, GARCIA,) 4 MG tablet    cyclobenzaprine (FLEXERIL) 10 MG tablet            Physical assessment findings, diagnostic testing(s) if applicable, and vital signs reviewed with patient/patient representative. Differential diagnosis(s) discussed with patient/patient representative. Prescription medications and/or over-the-counter medications for symptom management discussed. Patient is to follow-up with family care provider in 2-3 days if no improvement. If symptoms should worsen or change, go to the ED. Patient/patient representative is aware of care plan, questions answered, verbalizes understanding and is in agreement. Printed instructions attached to after visit summary. If COVID-19 positive or COVID-19 by PCR is pending at time of discharge patient is to quarantine/isolate according to [facility] guidelines.       PATIENT REFERRED TO:  Lesli Wan DO Gracy Alegriat, [address] [facility]  [facility]  [phone]    Schedule an appointment as soon as possible for a visit in 3 days  For further evaluation. , If symptoms change/worsen, go to the [address]  [phone]  Schedule an appointment as soon as possible for a visit   as needed, For further evaluation. , If symptoms change/worsen, go to the [address] [facility][note 3], APRN - CNP    Please note that some or all of this chart was generated using [employer] voice recognition software. Although every effort was made to ensure the accuracy of this automated transcription, some errors in transcription may have occurred.         Candi Mattson, SHIVAM - CNP  07/31/22 3333

## 2022-07-31 ASSESSMENT — ENCOUNTER SYMPTOMS
SINUS PRESSURE: 0
VOMITING: 0
ABDOMINAL PAIN: 0
NAUSEA: 0
SORE THROAT: 0
CHEST TIGHTNESS: 0
SHORTNESS OF BREATH: 0
COUGH: 0
BACK PAIN: 0
EYE ITCHING: 0
EYE REDNESS: 0
DIARRHEA: 0

## 2022-08-01 ENCOUNTER — TELEPHONE (OUTPATIENT)
Dept: FAMILY MEDICINE CLINIC | Age: 47
End: 2022-08-01

## 2022-09-01 ENCOUNTER — NURSE ONLY (OUTPATIENT)
Dept: LAB | Age: 47
End: 2022-09-01

## 2022-09-13 ENCOUNTER — HOSPITAL ENCOUNTER (OUTPATIENT)
Dept: WOMENS IMAGING | Age: 47
Discharge: HOME | End: 2022-09-13
Payer: COMMERCIAL

## 2022-09-13 DIAGNOSIS — Z12.31 VISIT FOR SCREENING MAMMOGRAM: ICD-10-CM

## 2022-09-13 LAB — CYTOLOGY THIN PREP PAP: NORMAL

## 2022-09-13 PROCEDURE — 77063 BREAST TOMOSYNTHESIS BI: CPT

## 2022-10-19 ENCOUNTER — TELEPHONE (OUTPATIENT)
Dept: FAMILY MEDICINE CLINIC | Age: 47
End: 2022-10-19

## 2022-10-19 DIAGNOSIS — M54.12 CERVICAL RADICULOPATHY: ICD-10-CM

## 2022-10-19 RX ORDER — METHYLPREDNISOLONE 4 MG/1
TABLET ORAL
Qty: 1 KIT | Refills: 0 | Status: SHIPPED | OUTPATIENT
Start: 2022-10-19 | End: 2022-10-25

## 2022-10-19 RX ORDER — CYCLOBENZAPRINE HCL 10 MG
10 TABLET ORAL NIGHTLY PRN
Qty: 7 TABLET | Refills: 0 | Status: SHIPPED | OUTPATIENT
Start: 2022-10-19

## 2022-10-19 NOTE — TELEPHONE ENCOUNTER
Patient, has another flare up of neck pain bilateral arm pain right worse then left. Patient was seen at the urgent care 7/29/22 with same symptoms given medrol dose mitra and flexeril 10 mg. Patient is having a root canal tomorrow asking if you will send scripts to [facility]. Please review and advise for a call back to patient.

## 2022-12-01 ENCOUNTER — OFFICE VISIT (OUTPATIENT)
Dept: FAMILY MEDICINE CLINIC | Age: 47
End: 2022-12-01
Payer: COMMERCIAL

## 2022-12-01 VITALS
OXYGEN SATURATION: 98 % | TEMPERATURE: 97.7 F | DIASTOLIC BLOOD PRESSURE: 80 MMHG | WEIGHT: 206.2 LBS | HEART RATE: 71 BPM | SYSTOLIC BLOOD PRESSURE: 120 MMHG | BODY MASS INDEX: 32.3 KG/M2 | RESPIRATION RATE: 15 BRPM

## 2022-12-01 DIAGNOSIS — Z01.818 PRE-OP EVALUATION: Primary | ICD-10-CM

## 2022-12-01 PROCEDURE — G8484 FLU IMMUNIZE NO ADMIN: HCPCS | Performed by: FAMILY MEDICINE

## 2022-12-01 PROCEDURE — 99214 OFFICE O/P EST MOD 30 MIN: CPT | Performed by: FAMILY MEDICINE

## 2022-12-01 SDOH — ECONOMIC STABILITY: FOOD INSECURITY: WITHIN THE PAST 12 MONTHS, THE FOOD YOU BOUGHT JUST DIDN'T LAST AND YOU DIDN'T HAVE MONEY TO GET MORE.: NEVER TRUE

## 2022-12-01 SDOH — ECONOMIC STABILITY: FOOD INSECURITY: WITHIN THE PAST 12 MONTHS, YOU WORRIED THAT YOUR FOOD WOULD RUN OUT BEFORE YOU GOT MONEY TO BUY MORE.: NEVER TRUE

## 2022-12-01 ASSESSMENT — PATIENT HEALTH QUESTIONNAIRE - PHQ9
6. FEELING BAD ABOUT YOURSELF - OR THAT YOU ARE A FAILURE OR HAVE LET YOURSELF OR YOUR FAMILY DOWN: 0
2. FEELING DOWN, DEPRESSED OR HOPELESS: 0
9. THOUGHTS THAT YOU WOULD BE BETTER OFF DEAD, OR OF HURTING YOURSELF: 0
SUM OF ALL RESPONSES TO PHQ QUESTIONS 1-9: 0
5. POOR APPETITE OR OVEREATING: 0
3. TROUBLE FALLING OR STAYING ASLEEP: 0
SUM OF ALL RESPONSES TO PHQ9 QUESTIONS 1 & 2: 0
SUM OF ALL RESPONSES TO PHQ QUESTIONS 1-9: 0
4. FEELING TIRED OR HAVING LITTLE ENERGY: 0
7. TROUBLE CONCENTRATING ON THINGS, SUCH AS READING THE NEWSPAPER OR WATCHING TELEVISION: 0
SUM OF ALL RESPONSES TO PHQ QUESTIONS 1-9: 0
8. MOVING OR SPEAKING SO SLOWLY THAT OTHER PEOPLE COULD HAVE NOTICED. OR THE OPPOSITE, BEING SO FIGETY OR RESTLESS THAT YOU HAVE BEEN MOVING AROUND A LOT MORE THAN USUAL: 0
SUM OF ALL RESPONSES TO PHQ QUESTIONS 1-9: 0
1. LITTLE INTEREST OR PLEASURE IN DOING THINGS: 0
10. IF YOU CHECKED OFF ANY PROBLEMS, HOW DIFFICULT HAVE THESE PROBLEMS MADE IT FOR YOU TO DO YOUR WORK, TAKE CARE OF THINGS AT HOME, OR GET ALONG WITH OTHER PEOPLE: 0

## 2022-12-01 ASSESSMENT — ENCOUNTER SYMPTOMS
GASTROINTESTINAL NEGATIVE: 1
RESPIRATORY NEGATIVE: 1

## 2022-12-01 ASSESSMENT — SOCIAL DETERMINANTS OF HEALTH (SDOH): HOW HARD IS IT FOR YOU TO PAY FOR THE VERY BASICS LIKE FOOD, HOUSING, MEDICAL CARE, AND HEATING?: NOT HARD AT ALL

## 2022-12-01 NOTE — PROGRESS NOTES
Brian Montano (:  1975) is a 40 y.o. female,Established patient, here for evaluation of the following chief complaint(s):  Pre-op Exam (22 breast augmentation by Dr. Sisi Sotelo ) and Colon Cancer Screening (Discuss )        Subjective   SUBJECTIVE/OBJECTIVE:  HPI:    Chief Complaint   Patient presents with    Pre-op Exam     22 breast augmentation by Dr. Fito Khoury evaluation. Scheduled for breast augmentation with Dr. Bayron Gilliland on 22. Pt denies CP, chest tightness, SOB. Able to perform 4 METs with no cardiac symptoms. BP Readings from Last 3 Encounters:   22 120/80   22 (!) 126/96   21 124/72     Denies hx of general anesthesia complications. Patient Active Problem List   Diagnosis    Acne    Cervical pain-strain    Depression-stable    Over weight    Varicose veins of both lower extremities with pain, lt>rt. Osteoarthritis cervical spine    Obesity (BMI 30-39. 9)    Medication monitoring encounter    Allergic rhinitis    Menorrhagia    Otitis media, left     Past Surgical History:   Procedure Laterality Date     SECTION  /    DILATION AND CURETTAGE OF UTERUS      ENDOMETRIAL ABLATION      TUBAL LIGATION      VARICOSE VEIN SURGERY  2012     Social History     Tobacco Use    Smoking status: Former     Packs/day: 0.30     Years: 4.00     Pack years: 1.20     Types: Cigarettes     Quit date: 10/1/2012     Years since quitting: 10.1    Smokeless tobacco: Never   Substance Use Topics    Alcohol use: Yes     Comment: rarely    Drug use: No         Review of Systems   Constitutional: Negative. HENT: Negative. Respiratory: Negative. Cardiovascular: Negative. Gastrointestinal: Negative. Musculoskeletal: Negative. All other systems reviewed and are negative. Objective   Physical Exam  Vitals and nursing note reviewed.    Constitutional:       General:

## 2023-03-09 ENCOUNTER — OFFICE VISIT (OUTPATIENT)
Dept: FAMILY MEDICINE CLINIC | Age: 48
End: 2023-03-09
Payer: COMMERCIAL

## 2023-03-09 VITALS
HEART RATE: 88 BPM | DIASTOLIC BLOOD PRESSURE: 84 MMHG | WEIGHT: 224.8 LBS | SYSTOLIC BLOOD PRESSURE: 120 MMHG | TEMPERATURE: 98.1 F | BODY MASS INDEX: 35.28 KG/M2 | HEIGHT: 67 IN | RESPIRATION RATE: 18 BRPM

## 2023-03-09 DIAGNOSIS — H61.23 BILATERAL IMPACTED CERUMEN: Primary | ICD-10-CM

## 2023-03-09 PROCEDURE — 69210 REMOVE IMPACTED EAR WAX UNI: CPT | Performed by: NURSE PRACTITIONER

## 2023-03-09 PROCEDURE — 99213 OFFICE O/P EST LOW 20 MIN: CPT | Performed by: NURSE PRACTITIONER

## 2023-03-09 RX ORDER — AMOXICILLIN AND CLAVULANATE POTASSIUM 875; 125 MG/1; MG/1
TABLET, FILM COATED ORAL
COMMUNITY
Start: 2023-03-08

## 2023-03-09 RX ORDER — TRAZODONE HYDROCHLORIDE 50 MG/1
TABLET ORAL
COMMUNITY
Start: 2023-02-16

## 2023-03-09 RX ORDER — NEOMYCIN SULFATE, POLYMYXIN B SULFATE AND HYDROCORTISONE 10; 3.5; 1 MG/ML; MG/ML; [USP'U]/ML
SUSPENSION/ DROPS AURICULAR (OTIC)
COMMUNITY
Start: 2023-03-08

## 2023-03-09 SDOH — ECONOMIC STABILITY: INCOME INSECURITY: HOW HARD IS IT FOR YOU TO PAY FOR THE VERY BASICS LIKE FOOD, HOUSING, MEDICAL CARE, AND HEATING?: NOT HARD AT ALL

## 2023-03-09 SDOH — ECONOMIC STABILITY: HOUSING INSECURITY
IN THE LAST 12 MONTHS, WAS THERE A TIME WHEN YOU DID NOT HAVE A STEADY PLACE TO SLEEP OR SLEPT IN A SHELTER (INCLUDING NOW)?: NO

## 2023-03-09 SDOH — ECONOMIC STABILITY: FOOD INSECURITY: WITHIN THE PAST 12 MONTHS, THE FOOD YOU BOUGHT JUST DIDN'T LAST AND YOU DIDN'T HAVE MONEY TO GET MORE.: NEVER TRUE

## 2023-03-09 SDOH — ECONOMIC STABILITY: FOOD INSECURITY: WITHIN THE PAST 12 MONTHS, YOU WORRIED THAT YOUR FOOD WOULD RUN OUT BEFORE YOU GOT MONEY TO BUY MORE.: NEVER TRUE

## 2023-03-09 ASSESSMENT — PATIENT HEALTH QUESTIONNAIRE - PHQ9
SUM OF ALL RESPONSES TO PHQ QUESTIONS 1-9: 3
9. THOUGHTS THAT YOU WOULD BE BETTER OFF DEAD, OR OF HURTING YOURSELF: 0
7. TROUBLE CONCENTRATING ON THINGS, SUCH AS READING THE NEWSPAPER OR WATCHING TELEVISION: 0
3. TROUBLE FALLING OR STAYING ASLEEP: 3
2. FEELING DOWN, DEPRESSED OR HOPELESS: 0
5. POOR APPETITE OR OVEREATING: 0
SUM OF ALL RESPONSES TO PHQ QUESTIONS 1-9: 3
1. LITTLE INTEREST OR PLEASURE IN DOING THINGS: 0
10. IF YOU CHECKED OFF ANY PROBLEMS, HOW DIFFICULT HAVE THESE PROBLEMS MADE IT FOR YOU TO DO YOUR WORK, TAKE CARE OF THINGS AT HOME, OR GET ALONG WITH OTHER PEOPLE: 0
4. FEELING TIRED OR HAVING LITTLE ENERGY: 0
SUM OF ALL RESPONSES TO PHQ QUESTIONS 1-9: 3
SUM OF ALL RESPONSES TO PHQ9 QUESTIONS 1 & 2: 0
8. MOVING OR SPEAKING SO SLOWLY THAT OTHER PEOPLE COULD HAVE NOTICED. OR THE OPPOSITE, BEING SO FIGETY OR RESTLESS THAT YOU HAVE BEEN MOVING AROUND A LOT MORE THAN USUAL: 0
6. FEELING BAD ABOUT YOURSELF - OR THAT YOU ARE A FAILURE OR HAVE LET YOURSELF OR YOUR FAMILY DOWN: 0
SUM OF ALL RESPONSES TO PHQ QUESTIONS 1-9: 3

## 2023-03-09 ASSESSMENT — ENCOUNTER SYMPTOMS
NAUSEA: 0
SHORTNESS OF BREATH: 0
COUGH: 0
RHINORRHEA: 1
ABDOMINAL PAIN: 0

## 2023-03-09 NOTE — PROGRESS NOTES
Yinka Rodriguez (1975) 56 y.o. female here for evaluation of the following chief complaint(s):      HPI:  Chief Complaint   Patient presents with    Ear Fullness     Patient states that it feels like her right ear is plugged and she can not hear. 1 month ago bad head cold and sinus infection. Onset of 2.5 weeks ago right ear fullness and plugged. OTC:  Sudafed x 2-3 days    Started Augmentin and Ear drops yesterday. Drops seem to worsen symptoms in right ear and hearing completely gone. Denies pain. Sinus congestion well improved. Vitals:    03/09/23 1527   BP: 120/84   Pulse: 88   Resp: 18   Temp: 98.1 °F (36.7 °C)       Patient Active Problem List   Diagnosis    Acne    Cervical pain-strain    Depression-stable    Over weight    Varicose veins of both lower extremities with pain, lt>rt. Osteoarthritis cervical spine    Obesity (BMI 30-39. 9)    Medication monitoring encounter    Allergic rhinitis    Menorrhagia    Otitis media, left       SUBJECTIVE/OBJECTIVE:  Review of Systems   Constitutional:  Negative for chills and fever. HENT:  Positive for hearing loss and rhinorrhea. Negative for congestion, ear discharge and ear pain. Respiratory:  Negative for cough and shortness of breath. Cardiovascular:  Negative for chest pain. Gastrointestinal:  Negative for abdominal pain and nausea. Skin:  Negative for rash. Neurological:  Negative for dizziness, light-headedness and headaches. Psychiatric/Behavioral: Negative. Physical Exam  Constitutional:       General: She is not in acute distress. HENT:      Right Ear: There is impacted cerumen. Left Ear: There is impacted cerumen. Nose: Mucosal edema present. No congestion or rhinorrhea. Eyes:      Pupils: Pupils are equal, round, and reactive to light. Cardiovascular:      Rate and Rhythm: Normal rate and regular rhythm. Heart sounds: No murmur heard.   Pulmonary:      Effort: Pulmonary effort is normal. Breath sounds: Normal breath sounds. No wheezing. Abdominal:      General: Bowel sounds are normal. There is no distension. Palpations: Abdomen is soft. Tenderness: There is no abdominal tenderness. Musculoskeletal:         General: No tenderness. Normal range of motion. Cervical back: Normal range of motion and neck supple. Skin:     General: Skin is warm and dry. Findings: No rash. ASSESSMENT/PLAN:   Diagnosis Orders   1. Bilateral impacted cerumen  03392 - LA REMOVE IMPACTED EAR WAX            MDM:  Large amount cerumen removed   Likely culprit for symptoms  Still possible ETD  Stop ATB and Ear Drops  Notify tomorrow morning if continue with symptoms will rx Steroid  RTO PRN    Ceruminosis is noted. Wax is removed by syringing and manual debridement. Instructions for home care to prevent wax buildup are given. An electronic signature was used to authenticate this note.     --Clementine Crimes, APRN - CNP

## 2023-03-09 NOTE — PROGRESS NOTES
Patient had bilateral ears lavaged until clear per juliane Hernandez CNP. Patient tolerated well and without incident.

## 2023-03-10 ENCOUNTER — TELEPHONE (OUTPATIENT)
Dept: FAMILY MEDICINE CLINIC | Age: 48
End: 2023-03-10

## 2023-03-10 DIAGNOSIS — H69.83 DYSFUNCTION OF BOTH EUSTACHIAN TUBES: Primary | ICD-10-CM

## 2023-03-10 RX ORDER — METHYLPREDNISOLONE 4 MG/1
TABLET ORAL
Qty: 1 KIT | Refills: 0 | Status: SHIPPED | OUTPATIENT
Start: 2023-03-10 | End: 2023-03-16

## 2023-03-10 NOTE — TELEPHONE ENCOUNTER
Pt called office stating she had an appt yesterday with Sophie Kathleen CNP and a bilateral ear flush was done. Pt still feels like there is fluid in the right ear. She is requesting a steroid to be sent to [facility]. Pt says it was discussed yesterday if symptoms were no better this would be called in. Pt will check with the pharmacy after noon today. Please advise.

## 2023-09-21 ENCOUNTER — COMMUNITY OUTREACH (OUTPATIENT)
Dept: FAMILY MEDICINE CLINIC | Age: 48
End: 2023-09-21

## 2024-02-02 ENCOUNTER — HOSPITAL ENCOUNTER (EMERGENCY)
Age: 49
Discharge: HOME | End: 2024-02-02
Payer: COMMERCIAL

## 2024-02-02 VITALS
DIASTOLIC BLOOD PRESSURE: 73 MMHG | BODY MASS INDEX: 31.39 KG/M2 | HEIGHT: 67 IN | SYSTOLIC BLOOD PRESSURE: 141 MMHG | HEART RATE: 83 BPM | TEMPERATURE: 97.9 F | OXYGEN SATURATION: 98 % | WEIGHT: 200 LBS

## 2024-02-02 DIAGNOSIS — J40 BRONCHITIS: Primary | ICD-10-CM

## 2024-02-02 LAB
FLUAV AG SPEC QL: NEGATIVE
FLUBV AG SPEC QL: NEGATIVE

## 2024-02-02 PROCEDURE — 99213 OFFICE O/P EST LOW 20 MIN: CPT

## 2024-02-02 PROCEDURE — 87804 INFLUENZA ASSAY W/OPTIC: CPT

## 2024-02-02 RX ORDER — BROMPHENIRAMINE MALEATE, PSEUDOEPHEDRINE HYDROCHLORIDE, AND DEXTROMETHORPHAN HYDROBROMIDE 2; 30; 10 MG/5ML; MG/5ML; MG/5ML
10 SYRUP ORAL 4 TIMES DAILY PRN
Qty: 118 ML | Refills: 1 | Status: SHIPPED | OUTPATIENT
Start: 2024-02-02

## 2024-02-02 RX ORDER — PREDNISONE 20 MG/1
20 TABLET ORAL DAILY
Qty: 7 TABLET | Refills: 0 | Status: SHIPPED | OUTPATIENT
Start: 2024-02-02 | End: 2024-02-09

## 2024-02-02 RX ORDER — BENZONATATE 200 MG/1
200 CAPSULE ORAL 3 TIMES DAILY PRN
Qty: 30 CAPSULE | Refills: 0 | Status: SHIPPED | OUTPATIENT
Start: 2024-02-02 | End: 2024-02-09

## 2024-02-02 ASSESSMENT — ENCOUNTER SYMPTOMS
EYE DISCHARGE: 0
SORE THROAT: 0
VOMITING: 0
TROUBLE SWALLOWING: 0
EYE REDNESS: 0
RHINORRHEA: 1
NAUSEA: 0
SHORTNESS OF BREATH: 0
DIARRHEA: 0
COUGH: 1

## 2024-02-02 ASSESSMENT — PAIN - FUNCTIONAL ASSESSMENT: PAIN_FUNCTIONAL_ASSESSMENT: NONE - DENIES PAIN

## 2024-02-02 NOTE — ED PROVIDER NOTES
[facility] URGENT CARE  Urgent Care Encounter      CHIEF COMPLAINT       Chief Complaint   Patient presents with    Cough    Nasal Congestion       Nurses Notes reviewed and I agree except as noted in the HPI.  HISTORY OF PRESENT ILLNESS   Izzy Hauser is a 48 y.o. female who presents urgent care for evaluation of cough and nasal congestion. Pt states symptoms have been going on for 2 weeks but has gotten worse the past 3 days. Pt has a concern for influenza states co workers have been testing positive.  She states that her cough gets worse at night.  She has been unable to sleep for the last couple nights.  Until last night when her friend gave her a promethazine.  The medication knocked her out.  Denies any fevers chills body aches.    REVIEW OF SYSTEMS     Review of Systems   Constitutional:  Negative for chills, diaphoresis, fatigue and fever.   HENT:  Positive for congestion, postnasal drip and rhinorrhea. Negative for ear pain, sore throat and trouble swallowing.    Eyes:  Negative for discharge and redness.   Respiratory:  Positive for cough. Negative for shortness of breath.    Cardiovascular:  Negative for chest pain.   Gastrointestinal:  Negative for diarrhea, nausea and vomiting.   Genitourinary:  Negative for decreased urine volume.   Musculoskeletal:  Negative for neck pain and neck stiffness.   Skin:  Negative for rash.   Neurological:  Negative for headaches.   Hematological:  Negative for adenopathy.   Psychiatric/Behavioral:  Negative for sleep disturbance.        PAST MEDICAL HISTORY   History reviewed. No pertinent past medical history.    SURGICAL HISTORY     Patient  has a past surgical history that includes  section (//); Varicose vein surgery (2012); Tubal ligation (); Dilation and curettage of uterus (); and Endometrial ablation.    CURRENT MEDICATIONS       Discharge Medication List as of 2024  3:46 PM        CONTINUE these medications which

## 2024-02-02 NOTE — ED TRIAGE NOTES
Pt arrives ambulatory from lobby with c/o cough and congestion. Pt states symptoms have been going on for 2 weeks but has gotten worse the past 3 days. Pt has a concern for influenza states co workers have been testing positive. No other concerns at this time.

## 2024-02-02 NOTE — DISCHARGE INSTRUCTIONS
Influenza negative.  Prescription for prednisone, Tessalon Perles, and Bromfed.  Use over-the-counter Tylenol and Motrin for pain or fever.  Recommend the use of Flonase and Zyrtec to help dry nasal secretions.  Follow-up with PCP in 3 to 5 days worsening symptom.

## 2024-02-05 ENCOUNTER — TELEPHONE (OUTPATIENT)
Dept: FAMILY MEDICINE CLINIC | Age: 49
End: 2024-02-05

## 2024-03-05 ENCOUNTER — TELEMEDICINE (OUTPATIENT)
Dept: FAMILY MEDICINE CLINIC | Age: 49
End: 2024-03-05
Payer: COMMERCIAL

## 2024-03-05 DIAGNOSIS — G25.81 RESTLESS LEG SYNDROME: Primary | ICD-10-CM

## 2024-03-05 PROCEDURE — G2211 COMPLEX E/M VISIT ADD ON: HCPCS | Performed by: FAMILY MEDICINE

## 2024-03-05 PROCEDURE — 99213 OFFICE O/P EST LOW 20 MIN: CPT | Performed by: FAMILY MEDICINE

## 2024-03-05 RX ORDER — ROPINIROLE 0.5 MG/1
0.5 TABLET, FILM COATED ORAL NIGHTLY
Qty: 90 TABLET | Refills: 3 | Status: SHIPPED | OUTPATIENT
Start: 2024-03-05

## 2024-03-05 ASSESSMENT — ENCOUNTER SYMPTOMS
GASTROINTESTINAL NEGATIVE: 1
RESPIRATORY NEGATIVE: 1

## 2024-03-05 NOTE — PROGRESS NOTES
Izzy Hauser (:  1975) is a Established patient, here for evaluation of the following:    Subjective   HPI:    Chief Complaint   Patient presents with    Sleep Problem     See TE below:    Good morning Dr. Domínguez!      I wanted to know if I could get a prescription for restless  leg medicine?  I have tried everything from leg creams to over the counter meds and nothing is working. It’s keeping me up at night bc I’m constantly wanting and having the urge to move my legs & they itch. A friend of mine (co-worker) has a prescription called Ropinirole 0.5 prescription for her restless legs and gave me a couple to try and so far they’ve worked great.  Is there anything you can prescribe to help me please? I’m willing to try anything at this point.      Patient Active Problem List   Diagnosis    Acne    Cervical pain-strain    Depression-stable    Over weight    Varicose veins of both lower extremities with pain, lt>rt.    Osteoarthritis cervical spine    Obesity (BMI 30-39.9)    Medication monitoring encounter    Allergic rhinitis    Menorrhagia    Otitis media, left     Past Surgical History:   Procedure Laterality Date     SECTION  /    DILATION AND CURETTAGE OF UTERUS      ENDOMETRIAL ABLATION      TUBAL LIGATION      VARICOSE VEIN SURGERY  2012     Social History     Tobacco Use    Smoking status: Former     Current packs/day: 0.00     Average packs/day: 0.3 packs/day for 4.0 years (1.2 ttl pk-yrs)     Types: Cigarettes     Start date: 10/1/2008     Quit date: 10/1/2012     Years since quittin.4    Smokeless tobacco: Never   Substance Use Topics    Alcohol use: Yes     Comment: rarely    Drug use: No       Review of Systems   Constitutional: Negative.    HENT: Negative.     Respiratory: Negative.     Cardiovascular: Negative.    Gastrointestinal: Negative.    Musculoskeletal: Negative.    Psychiatric/Behavioral:  Positive for sleep disturbance.    All other systems reviewed

## 2024-05-06 ENCOUNTER — TELEMEDICINE (OUTPATIENT)
Dept: FAMILY MEDICINE CLINIC | Age: 49
End: 2024-05-06

## 2024-05-06 DIAGNOSIS — Z91.09 ENVIRONMENTAL ALLERGIES: ICD-10-CM

## 2024-05-06 DIAGNOSIS — J01.90 ACUTE RHINOSINUSITIS: Primary | ICD-10-CM

## 2024-05-06 RX ORDER — DOXYCYCLINE HYCLATE 100 MG
100 TABLET ORAL 2 TIMES DAILY
Qty: 14 TABLET | Refills: 0 | Status: SHIPPED | OUTPATIENT
Start: 2024-05-06 | End: 2024-05-13

## 2024-05-06 ASSESSMENT — ENCOUNTER SYMPTOMS
RHINORRHEA: 1
COUGH: 1
SINUS PAIN: 1
SINUS PRESSURE: 1
GASTROINTESTINAL NEGATIVE: 1

## 2024-05-06 NOTE — PROGRESS NOTES
Izzy Hauser (:  1975) is a Established patient, here for evaluation of the following:    Subjective   HPI:   Chief Complaint   Patient presents with    Congestion    Cough         See message below:    Good morning ! I have this bolton cold that won’t go away, it’s been about a week. I’m congested and have a cough and my nose is either stuffy or runny depending on the time of day. I’m leaving to go out of the country (on a cruise) on . But I’m leaving for [place] this Friday bc my daughter graduates from college on Saturday so I need to feel better SOON.   Can you prescribe me a Z-mitra or something to get me feeling better soon please?      Patient Active Problem List   Diagnosis    Acne    Cervical pain-strain    Depression-stable    Over weight    Varicose veins of both lower extremities with pain, lt>rt.    Osteoarthritis cervical spine    Obesity (BMI 30-39.9)    Medication monitoring encounter    Allergic rhinitis    Menorrhagia    Otitis media, left     Past Surgical History:   Procedure Laterality Date     SECTION  /    DILATION AND CURETTAGE OF UTERUS      ENDOMETRIAL ABLATION      TUBAL LIGATION      VARICOSE VEIN SURGERY  2012     Social History     Tobacco Use    Smoking status: Former     Current packs/day: 0.00     Average packs/day: 0.3 packs/day for 4.0 years (1.2 ttl pk-yrs)     Types: Cigarettes     Start date: 10/1/2008     Quit date: 10/1/2012     Years since quittin.6    Smokeless tobacco: Never   Substance Use Topics    Alcohol use: Yes     Comment: rarely    Drug use: No       Review of Systems   Constitutional: Negative.  Negative for fever and unexpected weight change.   HENT:  Positive for congestion, rhinorrhea, sinus pressure and sinus pain.    Respiratory:  Positive for cough.    Cardiovascular: Negative.    Gastrointestinal: Negative.    Musculoskeletal: Negative.    All other systems reviewed and are negative.         Objective

## 2024-05-22 ENCOUNTER — HOSPITAL ENCOUNTER (OUTPATIENT)
Dept: WOMENS IMAGING | Age: 49
Discharge: HOME | End: 2024-05-22
Payer: COMMERCIAL

## 2024-05-22 VITALS — WEIGHT: 203 LBS | BODY MASS INDEX: 31.79 KG/M2

## 2024-05-22 DIAGNOSIS — Z12.31 VISIT FOR SCREENING MAMMOGRAM: ICD-10-CM

## 2024-05-22 PROCEDURE — 77063 BREAST TOMOSYNTHESIS BI: CPT

## 2024-09-06 LAB
BUN BLDV-MCNC: 11 MG/DL
CALCIUM SERPL-MCNC: 9.4 MG/DL
CHLORIDE BLD-SCNC: 103 MMOL/L
CO2: 26 MMOL/L
CREAT SERPL-MCNC: 0.8 MG/DL
EGFR: NORMAL
GLUCOSE BLD-MCNC: 102 MG/DL
POTASSIUM SERPL-SCNC: 4.4 MMOL/L
SODIUM BLD-SCNC: 139 MMOL/L

## 2024-09-12 ENCOUNTER — TELEPHONE (OUTPATIENT)
Dept: FAMILY MEDICINE CLINIC | Age: 49
End: 2024-09-12

## 2024-09-12 ENCOUNTER — OFFICE VISIT (OUTPATIENT)
Dept: FAMILY MEDICINE CLINIC | Age: 49
End: 2024-09-12
Payer: COMMERCIAL

## 2024-09-12 VITALS
WEIGHT: 219.2 LBS | BODY MASS INDEX: 34.33 KG/M2 | DIASTOLIC BLOOD PRESSURE: 88 MMHG | RESPIRATION RATE: 12 BRPM | HEART RATE: 80 BPM | SYSTOLIC BLOOD PRESSURE: 132 MMHG | TEMPERATURE: 98.2 F

## 2024-09-12 DIAGNOSIS — G25.81 RESTLESS LEG SYNDROME: ICD-10-CM

## 2024-09-12 DIAGNOSIS — Z01.818 PRE-OP EVALUATION: Primary | ICD-10-CM

## 2024-09-12 DIAGNOSIS — M54.12 CERVICAL RADICULOPATHY: ICD-10-CM

## 2024-09-12 DIAGNOSIS — M48.02 CERVICAL STENOSIS OF SPINE: ICD-10-CM

## 2024-09-12 PROCEDURE — G2211 COMPLEX E/M VISIT ADD ON: HCPCS | Performed by: FAMILY MEDICINE

## 2024-09-12 PROCEDURE — 99214 OFFICE O/P EST MOD 30 MIN: CPT | Performed by: FAMILY MEDICINE

## 2024-09-12 RX ORDER — CYCLOBENZAPRINE HCL 10 MG
10 TABLET ORAL NIGHTLY PRN
Qty: 30 TABLET | Refills: 0 | Status: SHIPPED | OUTPATIENT
Start: 2024-09-12

## 2024-09-12 SDOH — ECONOMIC STABILITY: FOOD INSECURITY: WITHIN THE PAST 12 MONTHS, YOU WORRIED THAT YOUR FOOD WOULD RUN OUT BEFORE YOU GOT MONEY TO BUY MORE.: NEVER TRUE

## 2024-09-12 SDOH — ECONOMIC STABILITY: INCOME INSECURITY: HOW HARD IS IT FOR YOU TO PAY FOR THE VERY BASICS LIKE FOOD, HOUSING, MEDICAL CARE, AND HEATING?: NOT HARD AT ALL

## 2024-09-12 SDOH — ECONOMIC STABILITY: FOOD INSECURITY: WITHIN THE PAST 12 MONTHS, THE FOOD YOU BOUGHT JUST DIDN'T LAST AND YOU DIDN'T HAVE MONEY TO GET MORE.: NEVER TRUE

## 2024-09-12 ASSESSMENT — PATIENT HEALTH QUESTIONNAIRE - PHQ9
SUM OF ALL RESPONSES TO PHQ QUESTIONS 1-9: 0
7. TROUBLE CONCENTRATING ON THINGS, SUCH AS READING THE NEWSPAPER OR WATCHING TELEVISION: NOT AT ALL
8. MOVING OR SPEAKING SO SLOWLY THAT OTHER PEOPLE COULD HAVE NOTICED. OR THE OPPOSITE, BEING SO FIGETY OR RESTLESS THAT YOU HAVE BEEN MOVING AROUND A LOT MORE THAN USUAL: NOT AT ALL
2. FEELING DOWN, DEPRESSED OR HOPELESS: NOT AT ALL
SUM OF ALL RESPONSES TO PHQ QUESTIONS 1-9: 0
10. IF YOU CHECKED OFF ANY PROBLEMS, HOW DIFFICULT HAVE THESE PROBLEMS MADE IT FOR YOU TO DO YOUR WORK, TAKE CARE OF THINGS AT HOME, OR GET ALONG WITH OTHER PEOPLE: NOT DIFFICULT AT ALL
SUM OF ALL RESPONSES TO PHQ9 QUESTIONS 1 & 2: 0
5. POOR APPETITE OR OVEREATING: NOT AT ALL
SUM OF ALL RESPONSES TO PHQ QUESTIONS 1-9: 0
4. FEELING TIRED OR HAVING LITTLE ENERGY: NOT AT ALL
1. LITTLE INTEREST OR PLEASURE IN DOING THINGS: NOT AT ALL
SUM OF ALL RESPONSES TO PHQ QUESTIONS 1-9: 0
3. TROUBLE FALLING OR STAYING ASLEEP: NOT AT ALL
9. THOUGHTS THAT YOU WOULD BE BETTER OFF DEAD, OR OF HURTING YOURSELF: NOT AT ALL
6. FEELING BAD ABOUT YOURSELF - OR THAT YOU ARE A FAILURE OR HAVE LET YOURSELF OR YOUR FAMILY DOWN: NOT AT ALL

## 2024-09-16 ENCOUNTER — TELEPHONE (OUTPATIENT)
Dept: FAMILY MEDICINE CLINIC | Age: 49
End: 2024-09-16

## 2024-09-19 ENCOUNTER — TELEPHONE (OUTPATIENT)
Dept: FAMILY MEDICINE CLINIC | Age: 49
End: 2024-09-19

## 2024-10-09 ENCOUNTER — APPOINTMENT (OUTPATIENT)
Dept: GENERAL RADIOLOGY | Age: 49
End: 2024-10-09
Attending: ORTHOPAEDIC SURGERY
Payer: COMMERCIAL

## 2024-10-09 ENCOUNTER — HOSPITAL ENCOUNTER (OUTPATIENT)
Age: 49
Discharge: HOME HEALTH | End: 2024-10-10
Attending: ORTHOPAEDIC SURGERY | Admitting: ORTHOPAEDIC SURGERY
Payer: COMMERCIAL

## 2024-10-09 ENCOUNTER — ANESTHESIA EVENT (OUTPATIENT)
Dept: OPERATING ROOM | Age: 49
End: 2024-10-09
Payer: COMMERCIAL

## 2024-10-09 ENCOUNTER — ANESTHESIA (OUTPATIENT)
Dept: OPERATING ROOM | Age: 49
End: 2024-10-09
Payer: COMMERCIAL

## 2024-10-09 DIAGNOSIS — Z98.1 S/P CERVICAL SPINAL FUSION: Primary | ICD-10-CM

## 2024-10-09 PROBLEM — M48.02 CERVICAL STENOSIS OF SPINAL CANAL: Status: ACTIVE | Noted: 2024-10-09

## 2024-10-09 PROCEDURE — 72020 X-RAY EXAM OF SPINE 1 VIEW: CPT

## 2024-10-09 PROCEDURE — 97165 OT EVAL LOW COMPLEX 30 MIN: CPT

## 2024-10-09 PROCEDURE — L0120 CERV FLEX N/ADJ FOAM PRE OTS: HCPCS | Performed by: ORTHOPAEDIC SURGERY

## 2024-10-09 PROCEDURE — 7100000000 HC PACU RECOVERY - FIRST 15 MIN: Performed by: ORTHOPAEDIC SURGERY

## 2024-10-09 PROCEDURE — 2500000003 HC RX 250 WO HCPCS: Performed by: NURSE ANESTHETIST, CERTIFIED REGISTERED

## 2024-10-09 PROCEDURE — 2709999900 HC NON-CHARGEABLE SUPPLY: Performed by: ORTHOPAEDIC SURGERY

## 2024-10-09 PROCEDURE — 3700000001 HC ADD 15 MINUTES (ANESTHESIA): Performed by: ORTHOPAEDIC SURGERY

## 2024-10-09 PROCEDURE — 7100000001 HC PACU RECOVERY - ADDTL 15 MIN: Performed by: ORTHOPAEDIC SURGERY

## 2024-10-09 PROCEDURE — 6360000002 HC RX W HCPCS: Performed by: NURSE PRACTITIONER

## 2024-10-09 PROCEDURE — 6360000002 HC RX W HCPCS: Performed by: NURSE ANESTHETIST, CERTIFIED REGISTERED

## 2024-10-09 PROCEDURE — 3600000014 HC SURGERY LEVEL 4 ADDTL 15MIN: Performed by: ORTHOPAEDIC SURGERY

## 2024-10-09 PROCEDURE — 2580000003 HC RX 258: Performed by: NURSE PRACTITIONER

## 2024-10-09 PROCEDURE — 2500000003 HC RX 250 WO HCPCS: Performed by: ORTHOPAEDIC SURGERY

## 2024-10-09 PROCEDURE — 6370000000 HC RX 637 (ALT 250 FOR IP): Performed by: NURSE PRACTITIONER

## 2024-10-09 PROCEDURE — L0190 CERV COLLAR SUPP ADJ CERV BA: HCPCS | Performed by: ORTHOPAEDIC SURGERY

## 2024-10-09 PROCEDURE — 6360000002 HC RX W HCPCS: Performed by: ORTHOPAEDIC SURGERY

## 2024-10-09 PROCEDURE — 3700000000 HC ANESTHESIA ATTENDED CARE: Performed by: ORTHOPAEDIC SURGERY

## 2024-10-09 PROCEDURE — 2720000010 HC SURG SUPPLY STERILE: Performed by: ORTHOPAEDIC SURGERY

## 2024-10-09 PROCEDURE — 97535 SELF CARE MNGMENT TRAINING: CPT

## 2024-10-09 PROCEDURE — C1713 ANCHOR/SCREW BN/BN,TIS/BN: HCPCS | Performed by: ORTHOPAEDIC SURGERY

## 2024-10-09 PROCEDURE — 3600000004 HC SURGERY LEVEL 4 BASE: Performed by: ORTHOPAEDIC SURGERY

## 2024-10-09 PROCEDURE — C1889 IMPLANT/INSERT DEVICE, NOC: HCPCS | Performed by: ORTHOPAEDIC SURGERY

## 2024-10-09 PROCEDURE — 2580000003 HC RX 258: Performed by: ORTHOPAEDIC SURGERY

## 2024-10-09 DEVICE — 4.0MM VARIABLE ANGLE SCREW, SELF-TAPPING, 14MM
Type: IMPLANTABLE DEVICE | Site: NECK | Status: FUNCTIONAL
Brand: ADMIRAL

## 2024-10-09 DEVICE — INTERBODY, 14X13X8MM, 7 DEGREE, 3D
Type: IMPLANTABLE DEVICE | Site: NECK | Status: FUNCTIONAL
Brand: 3D PRINTED INTERBODY SYSTEMS

## 2024-10-09 DEVICE — GRAFT BNE MED: Type: IMPLANTABLE DEVICE | Site: NECK | Status: FUNCTIONAL

## 2024-10-09 DEVICE — CERVICAL PLATE, 2 LEVEL, 32MM
Type: IMPLANTABLE DEVICE | Site: NECK | Status: FUNCTIONAL
Brand: ADMIRAL

## 2024-10-09 DEVICE — INTERBODY, 14X13X7MM, 7 DEGREE, 3D
Type: IMPLANTABLE DEVICE | Site: NECK | Status: FUNCTIONAL
Brand: 3D PRINTED INTERBODY SYSTEMS

## 2024-10-09 DEVICE — GRAFT BNE 2 CC DBM FIBREX: Type: IMPLANTABLE DEVICE | Site: NECK | Status: FUNCTIONAL

## 2024-10-09 RX ORDER — ONDANSETRON 2 MG/ML
INJECTION INTRAMUSCULAR; INTRAVENOUS
Status: DISCONTINUED | OUTPATIENT
Start: 2024-10-09 | End: 2024-10-09 | Stop reason: SDUPTHER

## 2024-10-09 RX ORDER — OXYCODONE AND ACETAMINOPHEN 5; 325 MG/1; MG/1
2 TABLET ORAL EVERY 4 HOURS PRN
Status: DISCONTINUED | OUTPATIENT
Start: 2024-10-09 | End: 2024-10-10 | Stop reason: HOSPADM

## 2024-10-09 RX ORDER — SODIUM CHLORIDE 9 MG/ML
INJECTION, SOLUTION INTRAVENOUS PRN
Status: DISCONTINUED | OUTPATIENT
Start: 2024-10-09 | End: 2024-10-10 | Stop reason: HOSPADM

## 2024-10-09 RX ORDER — SODIUM CHLORIDE 0.9 % (FLUSH) 0.9 %
5-40 SYRINGE (ML) INJECTION EVERY 12 HOURS SCHEDULED
Status: DISCONTINUED | OUTPATIENT
Start: 2024-10-09 | End: 2024-10-09 | Stop reason: HOSPADM

## 2024-10-09 RX ORDER — FENTANYL CITRATE 50 UG/ML
50 INJECTION, SOLUTION INTRAMUSCULAR; INTRAVENOUS EVERY 5 MIN PRN
Status: DISCONTINUED | OUTPATIENT
Start: 2024-10-09 | End: 2024-10-09 | Stop reason: HOSPADM

## 2024-10-09 RX ORDER — ROPINIROLE 0.5 MG/1
0.5 TABLET, FILM COATED ORAL NIGHTLY
Status: DISCONTINUED | OUTPATIENT
Start: 2024-10-09 | End: 2024-10-10 | Stop reason: HOSPADM

## 2024-10-09 RX ORDER — SODIUM CHLORIDE 0.9 % (FLUSH) 0.9 %
5-40 SYRINGE (ML) INJECTION PRN
Status: DISCONTINUED | OUTPATIENT
Start: 2024-10-09 | End: 2024-10-09 | Stop reason: HOSPADM

## 2024-10-09 RX ORDER — MIDAZOLAM HYDROCHLORIDE 1 MG/ML
INJECTION INTRAMUSCULAR; INTRAVENOUS
Status: DISCONTINUED | OUTPATIENT
Start: 2024-10-09 | End: 2024-10-09 | Stop reason: SDUPTHER

## 2024-10-09 RX ORDER — SODIUM CHLORIDE 9 MG/ML
INJECTION, SOLUTION INTRAVENOUS PRN
Status: DISCONTINUED | OUTPATIENT
Start: 2024-10-09 | End: 2024-10-09 | Stop reason: HOSPADM

## 2024-10-09 RX ORDER — ACETAMINOPHEN 325 MG/1
650 TABLET ORAL EVERY 4 HOURS PRN
Status: DISCONTINUED | OUTPATIENT
Start: 2024-10-09 | End: 2024-10-10 | Stop reason: HOSPADM

## 2024-10-09 RX ORDER — DEXAMETHASONE SODIUM PHOSPHATE 10 MG/ML
10 INJECTION, EMULSION INTRAMUSCULAR; INTRAVENOUS EVERY 6 HOURS
Status: COMPLETED | OUTPATIENT
Start: 2024-10-09 | End: 2024-10-10

## 2024-10-09 RX ORDER — ROCURONIUM BROMIDE 10 MG/ML
INJECTION, SOLUTION INTRAVENOUS
Status: DISCONTINUED | OUTPATIENT
Start: 2024-10-09 | End: 2024-10-09 | Stop reason: SDUPTHER

## 2024-10-09 RX ORDER — DEXAMETHASONE SODIUM PHOSPHATE 4 MG/ML
INJECTION, SOLUTION INTRA-ARTICULAR; INTRALESIONAL; INTRAMUSCULAR; INTRAVENOUS; SOFT TISSUE
Status: DISCONTINUED | OUTPATIENT
Start: 2024-10-09 | End: 2024-10-09 | Stop reason: SDUPTHER

## 2024-10-09 RX ORDER — FENTANYL CITRATE 50 UG/ML
INJECTION, SOLUTION INTRAMUSCULAR; INTRAVENOUS
Status: DISCONTINUED | OUTPATIENT
Start: 2024-10-09 | End: 2024-10-09 | Stop reason: SDUPTHER

## 2024-10-09 RX ORDER — LIDOCAINE HYDROCHLORIDE AND EPINEPHRINE 10; 10 MG/ML; UG/ML
INJECTION, SOLUTION INFILTRATION; PERINEURAL PRN
Status: DISCONTINUED | OUTPATIENT
Start: 2024-10-09 | End: 2024-10-09 | Stop reason: ALTCHOICE

## 2024-10-09 RX ORDER — SODIUM CHLORIDE 0.9 % (FLUSH) 0.9 %
5-40 SYRINGE (ML) INJECTION EVERY 12 HOURS SCHEDULED
Status: DISCONTINUED | OUTPATIENT
Start: 2024-10-09 | End: 2024-10-10 | Stop reason: HOSPADM

## 2024-10-09 RX ORDER — OXYCODONE AND ACETAMINOPHEN 5; 325 MG/1; MG/1
1 TABLET ORAL EVERY 4 HOURS PRN
Status: DISCONTINUED | OUTPATIENT
Start: 2024-10-09 | End: 2024-10-10 | Stop reason: HOSPADM

## 2024-10-09 RX ORDER — LIDOCAINE HYDROCHLORIDE 20 MG/ML
INJECTION, SOLUTION INTRAVENOUS
Status: DISCONTINUED | OUTPATIENT
Start: 2024-10-09 | End: 2024-10-09 | Stop reason: SDUPTHER

## 2024-10-09 RX ORDER — CYCLOBENZAPRINE HCL 10 MG
10 TABLET ORAL 3 TIMES DAILY PRN
Status: DISCONTINUED | OUTPATIENT
Start: 2024-10-09 | End: 2024-10-10 | Stop reason: HOSPADM

## 2024-10-09 RX ORDER — HYDROMORPHONE HYDROCHLORIDE 2 MG/ML
INJECTION, SOLUTION INTRAMUSCULAR; INTRAVENOUS; SUBCUTANEOUS
Status: DISCONTINUED | OUTPATIENT
Start: 2024-10-09 | End: 2024-10-09 | Stop reason: SDUPTHER

## 2024-10-09 RX ORDER — PROPOFOL 10 MG/ML
INJECTION, EMULSION INTRAVENOUS
Status: DISCONTINUED | OUTPATIENT
Start: 2024-10-09 | End: 2024-10-09 | Stop reason: SDUPTHER

## 2024-10-09 RX ORDER — SODIUM CHLORIDE 9 MG/ML
INJECTION, SOLUTION INTRAVENOUS CONTINUOUS
Status: DISCONTINUED | OUTPATIENT
Start: 2024-10-09 | End: 2024-10-09 | Stop reason: HOSPADM

## 2024-10-09 RX ORDER — ONDANSETRON 2 MG/ML
4 INJECTION INTRAMUSCULAR; INTRAVENOUS EVERY 6 HOURS PRN
Status: DISCONTINUED | OUTPATIENT
Start: 2024-10-09 | End: 2024-10-10 | Stop reason: HOSPADM

## 2024-10-09 RX ORDER — POLYETHYLENE GLYCOL 3350 17 G/17G
17 POWDER, FOR SOLUTION ORAL DAILY
Status: DISCONTINUED | OUTPATIENT
Start: 2024-10-09 | End: 2024-10-10 | Stop reason: HOSPADM

## 2024-10-09 RX ORDER — MORPHINE SULFATE 4 MG/ML
4 INJECTION, SOLUTION INTRAMUSCULAR; INTRAVENOUS
Status: ACTIVE | OUTPATIENT
Start: 2024-10-09 | End: 2024-10-10

## 2024-10-09 RX ORDER — SODIUM CHLORIDE 9 MG/ML
INJECTION, SOLUTION INTRAVENOUS CONTINUOUS
Status: DISCONTINUED | OUTPATIENT
Start: 2024-10-09 | End: 2024-10-10 | Stop reason: HOSPADM

## 2024-10-09 RX ORDER — SODIUM CHLORIDE 0.9 % (FLUSH) 0.9 %
5-40 SYRINGE (ML) INJECTION PRN
Status: DISCONTINUED | OUTPATIENT
Start: 2024-10-09 | End: 2024-10-10 | Stop reason: HOSPADM

## 2024-10-09 RX ORDER — ONDANSETRON 4 MG/1
4 TABLET, ORALLY DISINTEGRATING ORAL EVERY 8 HOURS PRN
Status: DISCONTINUED | OUTPATIENT
Start: 2024-10-09 | End: 2024-10-10 | Stop reason: HOSPADM

## 2024-10-09 RX ORDER — NALOXONE HYDROCHLORIDE 0.4 MG/ML
INJECTION, SOLUTION INTRAMUSCULAR; INTRAVENOUS; SUBCUTANEOUS PRN
Status: DISCONTINUED | OUTPATIENT
Start: 2024-10-09 | End: 2024-10-09 | Stop reason: HOSPADM

## 2024-10-09 RX ORDER — TRAZODONE HYDROCHLORIDE 50 MG/1
50 TABLET, FILM COATED ORAL NIGHTLY
Status: DISCONTINUED | OUTPATIENT
Start: 2024-10-09 | End: 2024-10-10 | Stop reason: HOSPADM

## 2024-10-09 RX ORDER — BISACODYL 10 MG
10 SUPPOSITORY, RECTAL RECTAL DAILY PRN
Status: DISCONTINUED | OUTPATIENT
Start: 2024-10-09 | End: 2024-10-10 | Stop reason: HOSPADM

## 2024-10-09 RX ORDER — MORPHINE SULFATE 2 MG/ML
2 INJECTION, SOLUTION INTRAMUSCULAR; INTRAVENOUS
Status: ACTIVE | OUTPATIENT
Start: 2024-10-09 | End: 2024-10-10

## 2024-10-09 RX ADMIN — ROPINIROLE HYDROCHLORIDE 0.5 MG: 0.5 TABLET, FILM COATED ORAL at 20:44

## 2024-10-09 RX ADMIN — LIDOCAINE HYDROCHLORIDE 100 MG: 20 INJECTION, SOLUTION INTRAVENOUS at 07:28

## 2024-10-09 RX ADMIN — DEXAMETHASONE SODIUM PHOSPHATE 10 MG: 4 INJECTION, SOLUTION INTRAMUSCULAR; INTRAVENOUS at 08:10

## 2024-10-09 RX ADMIN — FENTANYL CITRATE 100 MCG: 50 INJECTION, SOLUTION INTRAMUSCULAR; INTRAVENOUS at 07:21

## 2024-10-09 RX ADMIN — ONDANSETRON 4 MG: 2 INJECTION INTRAMUSCULAR; INTRAVENOUS at 08:10

## 2024-10-09 RX ADMIN — SODIUM CHLORIDE: 9 INJECTION, SOLUTION INTRAVENOUS at 10:13

## 2024-10-09 RX ADMIN — HYDROMORPHONE HYDROCHLORIDE 0.5 MG: 2 INJECTION INTRAMUSCULAR; INTRAVENOUS; SUBCUTANEOUS at 09:04

## 2024-10-09 RX ADMIN — ROCURONIUM BROMIDE 50 MG: 10 INJECTION INTRAVENOUS at 07:28

## 2024-10-09 RX ADMIN — PROPOFOL 200 MG: 10 INJECTION, EMULSION INTRAVENOUS at 07:28

## 2024-10-09 RX ADMIN — MIDAZOLAM 2 MG: 1 INJECTION INTRAMUSCULAR; INTRAVENOUS at 07:21

## 2024-10-09 RX ADMIN — WATER 2000 MG: 1 INJECTION INTRAMUSCULAR; INTRAVENOUS; SUBCUTANEOUS at 23:56

## 2024-10-09 RX ADMIN — OXYCODONE HYDROCHLORIDE AND ACETAMINOPHEN 2 TABLET: 5; 325 TABLET ORAL at 12:38

## 2024-10-09 RX ADMIN — WATER 2000 MG: 1 INJECTION INTRAMUSCULAR; INTRAVENOUS; SUBCUTANEOUS at 15:57

## 2024-10-09 RX ADMIN — DEXAMETHASONE SODIUM PHOSPHATE 10 MG: 10 INJECTION, EMULSION INTRAMUSCULAR; INTRAVENOUS at 20:44

## 2024-10-09 RX ADMIN — OXYCODONE HYDROCHLORIDE AND ACETAMINOPHEN 2 TABLET: 5; 325 TABLET ORAL at 22:04

## 2024-10-09 RX ADMIN — SODIUM CHLORIDE: 9 INJECTION, SOLUTION INTRAVENOUS at 06:49

## 2024-10-09 RX ADMIN — SODIUM CHLORIDE: 9 INJECTION, SOLUTION INTRAVENOUS at 17:10

## 2024-10-09 RX ADMIN — WATER 2000 MG: 1 INJECTION INTRAMUSCULAR; INTRAVENOUS; SUBCUTANEOUS at 07:36

## 2024-10-09 RX ADMIN — HYDROMORPHONE HYDROCHLORIDE 0.5 MG: 2 INJECTION INTRAMUSCULAR; INTRAVENOUS; SUBCUTANEOUS at 08:20

## 2024-10-09 RX ADMIN — ONDANSETRON 4 MG: 2 INJECTION INTRAMUSCULAR; INTRAVENOUS at 17:06

## 2024-10-09 RX ADMIN — SODIUM CHLORIDE, PRESERVATIVE FREE 10 ML: 5 INJECTION INTRAVENOUS at 10:13

## 2024-10-09 RX ADMIN — DEXAMETHASONE SODIUM PHOSPHATE 10 MG: 10 INJECTION, EMULSION INTRAMUSCULAR; INTRAVENOUS at 12:39

## 2024-10-09 ASSESSMENT — PAIN DESCRIPTION - DESCRIPTORS
DESCRIPTORS: STABBING
DESCRIPTORS: ACHING

## 2024-10-09 ASSESSMENT — PAIN - FUNCTIONAL ASSESSMENT
PAIN_FUNCTIONAL_ASSESSMENT: FACE, LEGS, ACTIVITY, CRY, AND CONSOLABILITY (FLACC)
PAIN_FUNCTIONAL_ASSESSMENT: PREVENTS OR INTERFERES SOME ACTIVE ACTIVITIES AND ADLS
PAIN_FUNCTIONAL_ASSESSMENT: PREVENTS OR INTERFERES SOME ACTIVE ACTIVITIES AND ADLS
PAIN_FUNCTIONAL_ASSESSMENT: ACTIVITIES ARE NOT PREVENTED

## 2024-10-09 ASSESSMENT — PAIN SCALES - GENERAL
PAINLEVEL_OUTOF10: 7
PAINLEVEL_OUTOF10: 5
PAINLEVEL_OUTOF10: 5
PAINLEVEL_OUTOF10: 6

## 2024-10-09 ASSESSMENT — LIFESTYLE VARIABLES: SMOKING_STATUS: 1

## 2024-10-09 ASSESSMENT — PAIN DESCRIPTION - ORIENTATION
ORIENTATION: POSTERIOR;LEFT
ORIENTATION: ANTERIOR;POSTERIOR
ORIENTATION: RIGHT;LEFT
ORIENTATION: ANTERIOR;POSTERIOR;RIGHT;LEFT

## 2024-10-09 ASSESSMENT — PAIN DESCRIPTION - LOCATION
LOCATION: SHOULDER;NECK
LOCATION: SHOULDER;NECK
LOCATION: NECK;SHOULDER
LOCATION: NECK;SHOULDER

## 2024-10-09 NOTE — PLAN OF CARE
Problem: Discharge Planning  Goal: Discharge to home or other facility with appropriate resources  Outcome: Progressing     Problem: Pain  Goal: Verbalizes/displays adequate comfort level or baseline comfort level  Outcome: Progressing   Care plan reviewed with patient and family.  Patient and family verbalize understanding of the plan of care and contribute to goal setting.

## 2024-10-09 NOTE — BRIEF OP NOTE
Brief Postoperative Note      Patient: Izzy Hauser  YOB: 1975  MRN: [id number]    Date of Procedure: 10/9/2024    Pre-Op Diagnosis Codes:      * Spinal stenosis, cervical region [M48.02]    Post-Op Diagnosis: Same       Procedure(s):  ACDF C5-C7    Surgeon(s):  Jakub Morrell MD    Assistant:  Lisbeth Donahue CNP-FAC    Anesthesia: General    Estimated Blood Loss (mL): less than 50     Complications: None    Specimens:   * No specimens in log *    Implants:  Implant Name Type Inv. Item Serial No.  Lot No. LRB No. Used Action   CAGE SPNL 7 DEG [id number] MM 3D - [id number]  CAGE SPNL 7 DEG [id number] MM 3D  [employer] [id number] N/A 1 Implanted   GRAFT BNE 2 CC DBM FIBREX - [id number]  GRAFT BNE 2 CC DBM FIBREX  [employer]  N/A 1 Implanted   GRAFT BNE MED - [id number]  GRAFT BNE MED [id number] BIOLOGICA  N/A 1 Implanted   CAGE SPNL 7 DEG [id number] MM 3D - [id number]  CAGE SPNL 7 DEG [id number] MM 3D  [employer] [id number] N/A 1 Implanted   SCREW SPNL ST 4X14 MM VA ADMIRAL [id number] - [id number]  SCREW SPNL ST 4X14 MM VA ADMIRAL [id number]  [employer]  N/A 6 Implanted   PLATE SPNL 2 LEVEL 32 MM CERV ADMIRAL [id number] - [id number]  PLATE SPNL 2 LEVEL 32 MM CERV ADMIRAL [id number]  [employer]  N/A 1 Implanted         Drains: * No LDAs found *    Findings:  Infection Present At Time Of Surgery (PATOS) (choose all levels that have infection present):  No infection present  Other Findings: same    Electronically signed by Jakub Morrell MD on 10/9/2024 at 9:19 AM

## 2024-10-09 NOTE — H&P
I have reviewed the history and physical and examined the patient.  I find no relevant changes.  I have reviewed with the patient and/or family members, during the preoperative office visit the risks, benefits, and alternatives to the procedure.    Jakub Morrell MD

## 2024-10-09 NOTE — OP NOTE
[address] [address]                            OPERATIVE REPORT      PATIENT NAME: BOZENA MORAN                : 1975  MED REC NO: [id number]                       ROOM: [facility]                                               (General) POOL                                               RM    ACCOUNT NO: [id number]                       ADMIT DATE: 10/09/2024  PROVIDER: Jakub Morrell MD      DATE OF PROCEDURE:  10/09/2024    SURGEON:  Jakub Morrell MD    PREOPERATIVE DIAGNOSES:    1. Cervical spondylosis with disk herniation of levels of C5-C6 and C6-C7.  2. Bilateral upper extremity radiculopathy.  3. Obesity with body mass index of 35.02 as the patient is 5 feet 7 inches in height and 223 pounds in weight.    POSTOPERATIVE DIAGNOSES:    1. Cervical spondylosis with disk herniation of levels of C5-C6 and C6-C7.  2. Bilateral upper extremity radiculopathy.  3. Obesity with body mass index of 35.02 as the patient is 5 feet 7 inches in height and 223 pounds in weight.    PROCEDURES:    1. Anterior cervical diskectomies of levels of C5-C6 and C6-C7 with complete uncus decompression and clearance of canal for these 2 levels of surgery.  2. Anterior cervical interbody fusion of levels of C5-C6 and C6-C7.  3. Insertion of interbody cage fixation, the SeaSpine WaveForm cage, at levels of C5-C6 and C6-C7 with 2 separate pieces.  4. Use of interbody bone grafting DBM FiberX 2 mL kit by [employer] and a 2.5 mL kit of the ProteiOS growth factor with interbody fusion of levels of C5-C6 and C6-C7.  5. Plating of cervical spine at levels of C5, C6, and C7 with use of the 32 mm SeaSpine Admiral plate attached to bone with total of 6 screws each measuring 14 mm in length.  6. Increase of time of operation due to BMI of 35.02, increase in the duration of time by 50% more than usual because of body habitus with BMI of 35.02.    ASSISTANT:  Lisbeth  inches of length of skin marked.  I injected 2 mL of a 1% lidocaine with epinephrine after the prepping and draping.  We used a soap scrub solution, sterile towel and a ChloraPrep solution, and she received 2 g IV Ancef and 1 g intravenous TXA to completion.  After the formal time-out was observed, skin was then incised and we would expose the cervical spine dividing skin, platysma layer and the pretracheal fascia in the midline to leftward direction in a curvilinear manner measuring 2 inches.  We divided skin, platysma, and the pretracheal fascia dividing soft tissues gently and retracting the esophagus and trachea rightward and divided the omohyoid muscle to reflect the SCM and carotid sheath leftward.  This exposed levels of C5 through C7 and placed a pin at the level of C4-C5 to ensure visibility with x-ray.  Took x-rays and confirmed levels of operation with the pin glenn at level of C4-C5.  I moved down to the level of C6-C7 and placed Portland pins after the annulotomy and centered over the disk space at level of C6-C7 for diskectomy.  With the retractor and distractor set, we then brought the microscope in and performed a complete cervical spine diskectomy with use of the curettage, saeed, and Kerrison rongeur used for complete cervical spine removal of disk material.  The endplates were also well decorticated.  We took down all osteophytes, PLL, and remaining disk material to clear the canal very thoroughly and take down the PLL bilateral.  This allowed free pathway for the opening foramina to be checked with a micro ball-tip probe to then assess sizing for cage fixation.  The 8 x 14 x 12 mm size cage was chosen by the [employer] system which we filled with use of a DBM bone graft gel and ProteiOS.  We tamped this 8 x 14 x 12 mm cage into place at level of C6-C7 and it fit very well.  After relaxing the distraction pin at C7, then moved up to level of C5 and the pin site for level C7 filled with use of

## 2024-10-09 NOTE — PROGRESS NOTES
0929: pt arrives to pacu. Pt on 6L nasal cannula. Pt very drowsy. VSS. Respirations unlabored. C collar in place. Ice placed on neck. Shahzad drain compressed. Pt denies pain. Pt states she has numbness and tinging in upper extremities  0935: pt snoring in bed. Pt on 3L nasal cannula    0943: this rn woke pt up from sleeping to ask about pain - pt denies pain   0948: report called to  julito benoit   0953: called [facility] to let them update family that pt will be heading upstairs   1000: transport arrives- pt transported to  in stable condition. Pt denies numbness and tingling in upper extremities

## 2024-10-09 NOTE — ANESTHESIA PRE PROCEDURE
Department of Anesthesiology  Preprocedure Note       Name:  Izzy Hauser   Age:  48 y.o.  :  1975                                          MRN:  [id number]         Date:  10/9/2024      Surgeon: Surgeon(s):  Jakub Morrell MD    Procedure: Procedure(s):  ACDF C5-C7    Medications prior to admission:   Prior to Admission medications    Medication Sig Start Date End Date Taking? Authorizing Provider   cyclobenzaprine (FLEXERIL) 10 MG tablet Take 1 tablet by mouth nightly as needed for Muscle spasms 24  Yes Jairo Domínguez,    rOPINIRole (REQUIP) 0.5 MG tablet Take 1 tablet by mouth at bedtime 3/5/24  Yes Jairo Domínguez DO   traZODone (DESYREL) 50 MG tablet TAKE 1 TABLET BY MOUTH AT BEDTIME 23  Yes ProviderSergo MD       Current medications:    Current Facility-Administered Medications   Medication Dose Route Frequency Provider Last Rate Last Admin   • 0.9 % sodium chloride infusion   IntraVENous Continuous Jakub Morrell  mL/hr at 10/09/24 0649 New Bag at 10/09/24 0649   • ceFAZolin (ANCEF) 2,000 mg in sterile water 20 mL IV syringe  2,000 mg IntraVENous Once Jakub Morrell MD       • sodium chloride flush 0.9 % injection 5-40 mL  5-40 mL IntraVENous 2 times per day Lisbeth Donahue APRMARIA INES - CNP       • sodium chloride flush 0.9 % injection 5-40 mL  5-40 mL IntraVENous PRN Lisbeth Donahue APRMARIA INES - CNP       • 0.9 % sodium chloride infusion   IntraVENous PRN Lisbeth Donahue APRN - CNP           Allergies:  No Known Allergies    Problem List:    Patient Active Problem List   Diagnosis Code   • Acne L70.9   • Cervical pain-strain M54.2   • Depression-stable F32.A   • Over weight E66.3   • Varicose veins of both lower extremities with pain, lt>rt. I83.813   • Osteoarthritis cervical spine M47.812   • Obesity (BMI 30-39.9) E66.9   • Medication monitoring encounter Z51.81   • Allergic rhinitis J30.9   • Menorrhagia N92.0   • Otitis media, left H66.92   • Cervical

## 2024-10-09 NOTE — PROGRESS NOTES
Patient admitted to [facility] room 16  with  at bedside. Bed in low position side rails up call light in reach. Patient denies questions at this time.

## 2024-10-09 NOTE — H&P
[address] [address]                           HISTORY & PHYSICAL      PATIENT NAME: BOZENA MORAN                : 1975  MED REC NO: [id number]                       ROOM: [facility]                                               (General) POOL                                               RM    ACCOUNT NO: [id number]                       ADMIT DATE: 10/09/2024  PROVIDER: Lisbeth Donahue CNP      PRIMARY CARE PHYSICIAN:  Jairo Domínguez DO    This is a patient of Dr. Jakub Morrell, undergoing surgery on the date of 10/09/2024 at [facility] for an ACDF C5-C7.    CHIEF COMPLAINT:  Neck pain and bilateral upper extremity radiculopathy.    HISTORY OF PRESENT ILLNESS:  The patient is a 48-year-old female who has been dealing with significant symptoms of neck pain that radiates into her bilateral shoulders and down into her hands and fingers, in which she describes as numbness and tingling, which has worsened over the past 2 years and significantly in the past 3 months in which she does describe coordination along with dexterity difficulties with noted weakness, which has limited her functional ability on a day-to-day basis, primarily working on her computer.  She is very dependent on her arms and hands with completing her functional ability, with completing her job at duties and feel she is significantly limited due to her dexterity difficulties.  She has undergone multiple conservative treatments including chiropractor treatment, TENS unit, massage, muscle relaxers, over-the-counter anti-inflammatories, activity modifications, all with minimal to no substantial relief.  Due to ongoing symptoms, she had been worked up with an MRI of the cervical spine which did show 2 levels of disk herniation causing compression upon the ventral thecal sac, which was consistent with her myelopathic symptoms of coordination and  dexterity difficulties.  Due to ongoing symptoms and failed conservative treatment, she has decided to proceed with definitive treatment with surgical intervention with Dr. Jakub Morrell.  She has been cleared by her PCP, Dr. Jairo Domínguez.    ALLERGIES:  NO KNOWN DRUG ALLERGIES.      PAST MEDICAL HISTORY:  No known past medical history.    CURRENT MEDICATIONS:  Trazodone, ropinirole.    PAST SURGICAL HISTORY:  Tummy tuck and breast augmentation.    SOCIAL HISTORY:  Patient lives in a private home.  She is a former smoker.  She does work full-time as a .    REVIEW OF SYSTEMS:  GENERAL:  Denies fever, fatigue, or chills.  RESPIRATORY:  Denies shortness of breath, productive cough, or wheezing.  CARDIAC:  Denies chest pain, palpitations, or leg swelling.  GI:  Denies nausea, vomiting, or abdominal pain.  :  Denies dysuria or incontinence.  NEURO:  Denies seizures, blackouts, fainting.  MUSCULOSKELETAL:  Complains of significant neck pain along with coordination and dexterity difficulties with associated weakness with the use of her bilateral upper extremities.    PHYSICAL EXAMINATION:  VITAL SIGNS:  Height 5 feet 7 inches, weight 208 pounds, BMI 32.57.  GENERAL:  The patient is pleasant, cooperative, alert, oriented, sitting in chair, in no acute distress.  She ambulates unassisted today.  BILATERAL UPPER EXTREMITIES:  Skin is warm, dry, and intact.  She maintains +2 pulses at the wrist bilaterally.  Normal sensation throughout her upper extremities with soft touch.  Motor strength is 5/5 with bilateral hand , finger extension, wrist extension as well as elbow flexion and elbow extension strength.  She does have decreased range of motion with flexion and extension.    IMAGING:  MRI of the cervical spine completed on the date of 07/18/2024 at the [facility]:  Right paracentral C5-C6 disk herniation causing ventral cord contact with the thecal sac, right paracentral C6-C7 disk herniation

## 2024-10-09 NOTE — PROGRESS NOTES
[facility]  INPATIENT OCCUPATIONAL THERAPY  [facility] ORTHOPEDICS 7K  EVALUATION      Discharge Recommendations: Continue to assess pending progress  Equipment Recommendations: No        Time In: 1630  Time Out: 1655  Timed Code Treatment Minutes: 10 Minutes  Minutes: 25          Date: 10/9/2024  Patient Name: Izzy Hauser,   Gender: female      MRN: [id number]  : 1975  (48 y.o.)  Referring Practitioner: SHIVAM Corcoran-CNP  Diagnosis: Spinal Stenosis Cervical Region  Additional Pertinent Hx: Pt admitted with elective sx to address neck pain radiating to her shoulders.  She works and it started 2 years ago and has progressively worsened over the past 3 months.  Pt's MRI of the cervical spine did show 2 levels of disk herniation causing compression upon the ventral thecal sac, which was consistent with her myelopathic symptoms of coordination and dexterity difficulties.  Pt is S/P ACDF C5-C7 on 10/9/24.    Restrictions/Precautions:  Restrictions/Precautions: Surgical Protocols  Required Braces or Orthoses  Cervical: c-collar (Aspen Collar)  Position Activity Restriction  Spinal Precautions: No lifting over 5 lbs    Subjective  Chart Reviewed: Yes, Orders, History and Physical  Family / Caregiver Present: Yes (Present and supportive)    Subjective: Pleasant and cooperative  Comments: RN approved session.  Pt agreed to try to get up to use the bathroom.  She C/O sharp pain in her neck and less tingling in her hands since the surgery.  Comments / Details: Pt returned to supine following the trip to the bathroom.  She had  melted ice pack on her anterior neck and through the collar at the start of the session.  This was replaced with a fresh one after the session.    Pain: 6/10: Neck pain while moving.  An ice pack was in place as pt had returned to supine with head of bed elevated following eval.    Vitals: Vitals not assessed per clinical judgement, see nursing flowsheet    Social/Functional  training  Additional Comments: Pt would be able to return home with spouse when medically stable and discharged from Acute.  No follow up OT needed after discharge.  Specific Instructions for Next Treatment: Functional mobility; ADLs and dynamic standing balance; BUE gentle ROM exercises.  See long-term goal time frame for expected duration of plan of care.  If no long-term goals established, a short length of stay is anticipated.    Goals:  Patient goals : \"I want to feel stronger and be able to return to being active at home and eventually return to working.\" pt states.  Short Term Goals  Time Frame for Short Term Goals: By discharge  Short Term Goal 1: Pt will demonstrate functional mobility walking to/from the kitchenette while using no AD with SBA and verbal cues for posture to prepare for getting around at home and doing light homemaking.  Short Term Goal 2: Pt will demonstrate dynamic reaching tasks while standing and using 1-2 hand release as needed with SBA to increase her balance and safety for ease of doing ADLs and any light homemaking.  Short Term Goal 3: Pt will complete BUE ROM exercises while using relaxed breathing following verbal cues if needed to decrease muscle tightness and increase pain free movement for ease of doing self care and any light homemaking.  Additional Goals?: No    AM-PAC Inpatient Daily Activity Raw Score: 19  AM-PAC Inpatient ADL T-Scale Score : 40.22    Following session, patient left in safe position with all fall risk precautions in place.

## 2024-10-10 VITALS
DIASTOLIC BLOOD PRESSURE: 90 MMHG | BODY MASS INDEX: 35.09 KG/M2 | HEIGHT: 67 IN | WEIGHT: 223.6 LBS | SYSTOLIC BLOOD PRESSURE: 140 MMHG | HEART RATE: 88 BPM | RESPIRATION RATE: 16 BRPM | OXYGEN SATURATION: 97 % | TEMPERATURE: 97.9 F

## 2024-10-10 LAB
BASOPHILS ABSOLUTE: 0 THOU/MM3 (ref 0–0.1)
BASOPHILS NFR BLD AUTO: 0.1 %
DEPRECATED RDW RBC AUTO: 44.2 FL (ref 35–45)
EOSINOPHIL NFR BLD AUTO: 0 %
EOSINOPHILS ABSOLUTE: 0 THOU/MM3 (ref 0–0.4)
ERYTHROCYTE [DISTWIDTH] IN BLOOD BY AUTOMATED COUNT: 14 % (ref 11.5–14.5)
HCT VFR BLD AUTO: 39.4 % (ref 37–47)
HGB BLD-MCNC: 12.9 GM/DL (ref 12–16)
IMM GRANULOCYTES # BLD AUTO: 0.17 THOU/MM3 (ref 0–0.07)
IMM GRANULOCYTES NFR BLD AUTO: 1 %
LYMPHOCYTES ABSOLUTE: 1.4 THOU/MM3 (ref 1–4.8)
LYMPHOCYTES NFR BLD AUTO: 8.3 %
MCH RBC QN AUTO: 28.4 PG (ref 26–33)
MCHC RBC AUTO-ENTMCNC: 32.7 GM/DL (ref 32.2–35.5)
MCV RBC AUTO: 86.6 FL (ref 81–99)
MONOCYTES ABSOLUTE: 0.4 THOU/MM3 (ref 0.4–1.3)
MONOCYTES NFR BLD AUTO: 2.4 %
NEUTROPHILS ABSOLUTE: 14.4 THOU/MM3 (ref 1.8–7.7)
NEUTROPHILS NFR BLD AUTO: 88.2 %
NRBC BLD AUTO-RTO: 0 /100 WBC
PLATELET # BLD AUTO: 270 THOU/MM3 (ref 130–400)
PMV BLD AUTO: 10.5 FL (ref 9.4–12.4)
RBC # BLD AUTO: 4.55 MILL/MM3 (ref 4.2–5.4)
WBC # BLD AUTO: 16.3 THOU/MM3 (ref 4.8–10.8)

## 2024-10-10 PROCEDURE — 97116 GAIT TRAINING THERAPY: CPT

## 2024-10-10 PROCEDURE — 6360000002 HC RX W HCPCS: Performed by: NURSE PRACTITIONER

## 2024-10-10 PROCEDURE — 85025 COMPLETE CBC W/AUTO DIFF WBC: CPT

## 2024-10-10 PROCEDURE — 2580000003 HC RX 258: Performed by: NURSE PRACTITIONER

## 2024-10-10 PROCEDURE — 6370000000 HC RX 637 (ALT 250 FOR IP): Performed by: NURSE PRACTITIONER

## 2024-10-10 PROCEDURE — 36415 COLL VENOUS BLD VENIPUNCTURE: CPT

## 2024-10-10 PROCEDURE — 97162 PT EVAL MOD COMPLEX 30 MIN: CPT

## 2024-10-10 RX ORDER — CYCLOBENZAPRINE HCL 10 MG
10 TABLET ORAL 3 TIMES DAILY PRN
Qty: 50 TABLET | Refills: 0 | Status: SHIPPED | OUTPATIENT
Start: 2024-10-10 | End: 2024-10-20

## 2024-10-10 RX ORDER — OXYCODONE AND ACETAMINOPHEN 5; 325 MG/1; MG/1
1 TABLET ORAL EVERY 4 HOURS PRN
Qty: 42 TABLET | Refills: 0 | Status: SHIPPED | OUTPATIENT
Start: 2024-10-10 | End: 2024-10-17

## 2024-10-10 RX ORDER — AMOXICILLIN 250 MG
1 CAPSULE ORAL DAILY PRN
Qty: 30 TABLET | Refills: 0 | Status: SHIPPED | OUTPATIENT
Start: 2024-10-10

## 2024-10-10 RX ADMIN — OXYCODONE HYDROCHLORIDE AND ACETAMINOPHEN 2 TABLET: 5; 325 TABLET ORAL at 11:01

## 2024-10-10 RX ADMIN — SODIUM CHLORIDE: 9 INJECTION, SOLUTION INTRAVENOUS at 00:00

## 2024-10-10 RX ADMIN — CYCLOBENZAPRINE 10 MG: 10 TABLET, FILM COATED ORAL at 05:25

## 2024-10-10 RX ADMIN — DEXAMETHASONE SODIUM PHOSPHATE 10 MG: 10 INJECTION, EMULSION INTRAMUSCULAR; INTRAVENOUS at 03:00

## 2024-10-10 RX ADMIN — SODIUM CHLORIDE, PRESERVATIVE FREE 10 ML: 5 INJECTION INTRAVENOUS at 08:33

## 2024-10-10 RX ADMIN — POLYETHYLENE GLYCOL 3350 17 G: 17 POWDER, FOR SOLUTION ORAL at 08:31

## 2024-10-10 ASSESSMENT — PAIN DESCRIPTION - DESCRIPTORS
DESCRIPTORS: SPASM
DESCRIPTORS: ACHING;SORE;DISCOMFORT
DESCRIPTORS: ACHING
DESCRIPTORS: ACHING;SORE
DESCRIPTORS: ACHING;DISCOMFORT;SORE

## 2024-10-10 ASSESSMENT — PAIN DESCRIPTION - LOCATION
LOCATION: NECK
LOCATION: NECK;SHOULDER
LOCATION: NECK;SHOULDER
LOCATION: THROAT;SHOULDER

## 2024-10-10 ASSESSMENT — PAIN - FUNCTIONAL ASSESSMENT
PAIN_FUNCTIONAL_ASSESSMENT: ACTIVITIES ARE NOT PREVENTED

## 2024-10-10 ASSESSMENT — PAIN SCALES - GENERAL
PAINLEVEL_OUTOF10: 5
PAINLEVEL_OUTOF10: 7
PAINLEVEL_OUTOF10: 6
PAINLEVEL_OUTOF10: 5

## 2024-10-10 ASSESSMENT — PAIN DESCRIPTION - ORIENTATION
ORIENTATION: ANTERIOR
ORIENTATION: ANTERIOR;POSTERIOR

## 2024-10-10 NOTE — PROGRESS NOTES
Patient discharged. Voiced understanding of all discharge instructions, medication, and follow-up instructions. Will pick meds up from pharmacy prior to leaving. Taken off unit via w/c by student nurse.  here for transport home.

## 2024-10-10 NOTE — PROGRESS NOTES
Department of Orthopedic Surgery  Spine Service  Progress Note        Subjective:   10/10/24  Izzy is resting in bed doing well. Sore throat as expected, will add throat lozenge to regimen. Surgical pain as expected, controlled. Improved BUE radiculopathy. Tolerating diet. Planning discharge home today with SHANNA drain and HHC.     Vitals  VITALS:  BP (!) 144/81   Pulse 84   Temp 98.5 °F (36.9 °C) (Oral)   Resp 18   Ht 1.702 m (5' 7\")   Wt 101.4 kg (223 lb 9.6 oz)   SpO2 93%   BMI 35.02 kg/m²   24HR INTAKE/OUTPUT:    Intake/Output Summary (Last 24 hours) at 10/10/2024 0732  Last data filed at 10/10/2024 0310  Gross per 24 hour   Intake 2667.6 ml   Output 17.5 ml   Net 2650.1 ml     URINARY CATHETER OUTPUT ([place]):     DRAIN/TUBE OUTPUT:  Closed/Suction Drain Left;Anterior Neck Bulb-Output (ml): 2.5 ml  VENT SETTINGS:     Additional Respiratory Assessments  Pulse: 84  Respirations: 18  SpO2: 93 %      PHYSICAL EXAM:    Orientation:  alert and oriented to person, place and time    Incision:  dressing in place, clean, dry, and intact    Upper Extremity Motor :  deltoids/biceps/triceps/wirst flexion/wrist extension/finger flexion/finger extension 5/5 bilaterally  Upper Extremity Sensory:  Intact C3-T1 distribution    ABNORMAL EXAM FINDINGS:  none    LABS:  No results for input(s): \"HGB\", \"HCT\" in the last 72 hours.    ASSESSMENT AND PLAN:    Post operative day 1    1:  Monitor labs and drain output  2:  Activity Level:  OOB with staff  3:  Pain Control:  Controlled, continue current regimen. Added throat lozenge for sore throat.   4:  Discharge Planning:  Home today with SHANNA drain and HHC if able to set up    Electronically signed by Leeroy Zuniga PA-C on 10/10/2024 at 7:30 AM

## 2024-10-10 NOTE — PLAN OF CARE
Problem: Discharge Planning  Goal: Discharge to home or other facility with appropriate resources  10/9/2024 2124 by Gianna Sue, RN  Outcome: Progressing  Flowsheets (Taken 10/9/2024 2124)  Discharge to home or other facility with appropriate resources:   Identify barriers to discharge with patient and caregiver   Arrange for needed discharge resources and transportation as appropriate   Identify discharge learning needs (meds, wound care, etc)  10/9/2024 1056 by Camelia Ortiz RN  Outcome: Progressing     Problem: Pain  Goal: Verbalizes/displays adequate comfort level or baseline comfort level  10/9/2024 2124 by Gianna Sue, RN  Outcome: Progressing  Flowsheets (Taken 10/9/2024 2124)  Verbalizes/displays adequate comfort level or baseline comfort level:   Encourage patient to monitor pain and request assistance   Assess pain using appropriate pain scale   Administer analgesics based on type and severity of pain and evaluate response   Implement non-pharmacological measures as appropriate and evaluate response  10/9/2024 1056 by Camelia Ortiz RN  Outcome: Progressing     Problem: Safety - Adult  Goal: Free from fall injury  Outcome: Progressing  Flowsheets  Taken 10/9/2024 2124  Free From Fall Injury: Instruct family/caregiver on patient safety  Taken 10/9/2024 2122  Free From Fall Injury: Instruct family/caregiver on patient safety     Problem: ABCDS Injury Assessment  Goal: Absence of physical injury  Outcome: Progressing  Flowsheets (Taken 10/9/2024 2124)  Absence of Physical Injury: Implement safety measures based on patient assessment     Problem: Respiratory - Adult  Goal: Achieves optimal ventilation and oxygenation  Outcome: Progressing  Flowsheets (Taken 10/9/2024 2124)  Achieves optimal ventilation and oxygenation:   Assess for changes in respiratory status   Assess for changes in mentation and behavior   Position to facilitate oxygenation and minimize respiratory effort   Assess and  Progressing  Flowsheets (Taken 10/9/2024 2124)  Absence of infection at discharge:   Assess and monitor for signs and symptoms of infection   Monitor lab/diagnostic results   Monitor all insertion sites i.e., indwelling lines, tubes and drains   [place] appropriate cooling/warming therapies per order   Administer medications as ordered   Instruct and encourage patient and family to use good hand hygiene technique     Problem: Hematologic - Adult  Goal: Maintains hematologic stability  Outcome: Progressing  Flowsheets (Taken 10/9/2024 2124)  Maintains hematologic stability:   Assess for signs and symptoms of bleeding or hemorrhage   Monitor labs for bleeding or clotting disorders   Care plan reviewed with patient and .  Patient and  verbalize understanding of the plan of care and contribute to goal setting.

## 2024-10-10 NOTE — PLAN OF CARE
Problem: Discharge Planning  Goal: Discharge to home or other facility with appropriate resources  Outcome: Adequate for Discharge     Problem: Pain  Goal: Verbalizes/displays adequate comfort level or baseline comfort level  Outcome: Adequate for Discharge     Problem: Safety - Adult  Goal: Free from fall injury  Outcome: Adequate for Discharge     Problem: ABCDS Injury Assessment  Goal: Absence of physical injury  Outcome: Adequate for Discharge     Problem: Respiratory - Adult  Goal: Achieves optimal ventilation and oxygenation  Outcome: Adequate for Discharge     Problem: Skin/Tissue Integrity - Adult  Goal: Incisions, wounds, or drain sites healing without S/S of infection  Outcome: Adequate for Discharge     Problem: Musculoskeletal - Adult  Goal: Return mobility to safest level of function  Outcome: Adequate for Discharge     Problem: Gastrointestinal - Adult  Goal: Maintains or returns to baseline bowel function  Outcome: Adequate for Discharge     Problem: Genitourinary - Adult  Goal: Absence of urinary retention  Outcome: Adequate for Discharge     Problem: Infection - Adult  Goal: Absence of infection at discharge  Outcome: Adequate for Discharge     Problem: Hematologic - Adult  Goal: Maintains hematologic stability  Outcome: Adequate for Discharge

## 2024-10-10 NOTE — PROGRESS NOTES
[facility]  INPATIENT PHYSICAL THERAPY  EVALUATION  [facility] ORTHOPEDICS 7K - 7K-19/019-A    Discharge Recommendations: 24 hour supervision or assist  Equipment Recommendations: No               Time In: 08  Time Out: 0848  Timed Code Treatment Minutes: 12 Minutes  Minutes: 23          Date: 10/10/2024  Patient Name: Izzy Hauser,  Gender:  female        MRN: [id number]  : 1975  (48 y.o.)      Referring Practitioner: LILY Donahue CNP  Diagnosis: Cervical stenosis of spinal canal  Additional Pertinent Hx: Pt admitted with elective sx to address neck pain radiating to her shoulders.  She works and it started 2 years ago and has progressively worsened over the past 3 months.  Pt's MRI of the cervical spine did show 2 levels of disk herniation causing compression upon the ventral thecal sac, which was consistent with her myelopathic symptoms of coordination and dexterity difficulties.  Pt is S/P ACDF C5-C7 on 10/9/24.     Restrictions/Precautions:  Restrictions/Precautions: Surgical Protocols  Required Braces or Orthoses  Cervical: c-collar (Aspen Collar)  Position Activity Restriction  Spinal Precautions: No lifting over 5 lbs  Other position/activity restrictions: cervical precautions    Subjective:  Chart Reviewed: Yes  Patient assessed for rehabilitation services?: Yes  Subjective: pleasant and cooperative, per pt plans to go home today and spouse will assist. pt stated has been lightheaded at times and PT encouraged spouse to provide SBA at all times initially at discharge and pt agreed.    General:        Hearing: Within functional limits       Pain: 6/10: neck per pt    Vitals: Vitals not assessed per clinical judgement, see nursing flowsheet    Social/Functional History:    Lives With: Spouse  Type of Home: House  Home Layout: One level, Laundry in basement  Home Access: Stairs to enter with rails  Entrance Stairs - Number of Steps: 2 steps; pt does not need to use the basement for a few weeks.

## 2024-10-10 NOTE — CARE COORDINATION
10/10/24, 12:01 PM EDT    Patient goals/plan/ treatment preferences discussed by  and .  Patient goals/plan/ treatment preferences reviewed with patient/ family.  Patient/ family verbalize understanding of discharge plan and are in agreement with goal/plan/treatment preferences.  Understanding was demonstrated using the teach back method.  AVS provided by RN at time of discharge, which includes all necessary medical information pertaining to the patients current course of illness, treatment, post-discharge goals of care, and treatment preferences.     Services At/After Discharge: Home Health    Izzy is returning home with her spouse and [facility] home health nurse for drain care. Acceptance from Eleni @ [facility] received at 1200.

## 2024-10-10 NOTE — PROGRESS NOTES
Alert and oriented X 4  Upper extremities are warm and dry and no numbness or tingling present   Cap refill <3 seconds; skin turgor is <3 seconds   Hand grasp is strong and equal bilaterally   Heart sound normal  Lung sounds clear no sounds of wheezing or crackles   Bowel sounds are active in all 4 quadrants   Lower extremities are warm and dry no numbness or tingling present   No edema present   Ped il pulses are equal and strong bilaterally   Pedal/ push is strong bilaterally   Current vitals: temp:97.8 pulse: 85 respirations: 16 BP: 147/87 Pain: 5/10 and its in her throat and shoulder   Pt states she doesn't need anything at this time and call light is within reach along with bedside table. Pt is currently up in chair.    Kaci WALTERS SN

## 2024-10-11 ENCOUNTER — TELEPHONE (OUTPATIENT)
Dept: FAMILY MEDICINE CLINIC | Age: 49
End: 2024-10-11

## 2024-10-11 NOTE — TELEPHONE ENCOUNTER
Care Transitions Initial Follow Up Call    Outreach made within 2 business days of discharge: Yes    Patient: Izzy Hauser Patient : 1975   MRN: [id number]  Reason for Admission: 10/09/2024  Discharge Date: 10/10/24       Spoke with: LVM    Discharge department/facility: [facility]        Scheduled appointment with PCP within 7-14 days    Follow Up  Future Appointments   Date Time Provider Department Center   10/16/2024  1:15 PM Jairo Domínguez DO [facility] DEP       MIRELLA MARTINEZ LPN    
Care Transitions Initial Follow Up Call    Outreach made within 2 business days of discharge: Yes    Patient: Izzy Hauser Patient : 1975   MRN: [id number]  Reason for Admission: Cervical stenosis/cervical spinal fusion  Discharge Date: 10/10/24       Spoke with: Pt    Discharge department/facility: [facility] Interactive Patient Contact:  Was patient able to fill all prescriptions: Yes  Was patient instructed to bring all medications to the follow-up visit: Yes  Is patient taking all medications as directed in the discharge summary? Yes  Does patient understand their discharge instructions: Yes  Does patient have questions or concerns that need addressed prior to 7-14 day follow up office visit: no    Additional needs identified to be addressed with provider               Scheduled appointment with PCP within 7-14 days    Follow Up  Future Appointments   Date Time Provider Department Center   10/16/2024  1:15 PM Jairo Domínguez DO [facility] DEP       Salome Clinton CMA (AAMA)  
Never smoker

## 2024-10-16 ENCOUNTER — OFFICE VISIT (OUTPATIENT)
Dept: FAMILY MEDICINE CLINIC | Age: 49
End: 2024-10-16

## 2024-10-16 VITALS
DIASTOLIC BLOOD PRESSURE: 88 MMHG | SYSTOLIC BLOOD PRESSURE: 134 MMHG | HEART RATE: 104 BPM | WEIGHT: 218.6 LBS | BODY MASS INDEX: 34.24 KG/M2 | RESPIRATION RATE: 16 BRPM

## 2024-10-16 DIAGNOSIS — M54.12 CERVICAL RADICULOPATHY: ICD-10-CM

## 2024-10-16 DIAGNOSIS — Z98.1 S/P CERVICAL SPINAL FUSION: ICD-10-CM

## 2024-10-16 DIAGNOSIS — M48.02 CERVICAL STENOSIS OF SPINE: Primary | ICD-10-CM

## 2024-10-16 DIAGNOSIS — G25.81 RESTLESS LEG SYNDROME: ICD-10-CM

## 2024-10-16 DIAGNOSIS — Z98.890 S/P CERVICAL DISCECTOMY: ICD-10-CM

## 2024-10-16 RX ORDER — ROPINIROLE 0.5 MG/1
0.5 TABLET, FILM COATED ORAL 3 TIMES DAILY
Qty: 90 TABLET | Refills: 0 | Status: SHIPPED | OUTPATIENT
Start: 2024-10-16

## 2024-10-16 NOTE — PROGRESS NOTES
daily 90 tablet 0    cyclobenzaprine (FLEXERIL) 10 MG tablet Take 1 tablet by mouth 3 times daily as needed for Muscle spasms 50 tablet 0    oxyCODONE-acetaminophen (PERCOCET) 5-325 MG per tablet Take 1 tablet by mouth every 4 hours as needed for Pain for up to 7 days. Max Daily Amount: 6 tablets 42 tablet 0    senna-docusate (SENOKOT S) 8.6-50 MG per tablet Take 1 tablet by mouth daily as needed for Constipation 30 tablet 0    traZODone (DESYREL) 50 MG tablet TAKE 1 TABLET BY MOUTH AT BEDTIME           Vitals:    10/16/24 1315   BP: 134/88   Site: Right Upper Arm   Position: Sitting   Cuff Size: Large Adult   Pulse: (!) 104   Resp: 16   Weight: 99.2 kg (218 lb 9.6 oz)     Body mass index is 34.24 kg/m².     Wt Readings from Last 3 Encounters:   10/16/24 99.2 kg (218 lb 9.6 oz)   10/09/24 101.4 kg (223 lb 9.6 oz)   10/07/24 101.7 kg (224 lb 3.3 oz)     BP Readings from Last 3 Encounters:   10/16/24 134/88   10/10/24 (!) 140/90   10/07/24 (!) 141/91        Patient was admitted to [facility] from 10/9/24 to 10/10/24 for neck surgery.    Inpatient course: Discharge summary reviewed- see chart.    Current status: improving    Review of Systems:  A comprehensive review of systems was negative except for what was noted in the HPI.    Physical Exam:  General Appearance: alert and oriented to person, place and time, well developed and well- nourished, in no acute distress  Skin: warm and dry, no rash or erythema  Head: normocephalic and atraumatic  Eyes: pupils equal, round, and reactive to light, extraocular eye movements intact, conjunctivae normal  ENT: tympanic membrane, external ear and ear canal normal bilaterally, nose without deformity, nasal mucosa and turbinates normal without polyps  Neck: supple and non-tender without mass, no thyromegaly or thyroid nodules, no cervical lymphadenopathy  Pulmonary/Chest: clear to auscultation bilaterally- no wheezes, rales or rhonchi, normal air movement, no

## 2024-11-14 DIAGNOSIS — G25.81 RESTLESS LEG SYNDROME: ICD-10-CM

## 2024-11-15 RX ORDER — ROPINIROLE 0.5 MG/1
0.5 TABLET, FILM COATED ORAL 3 TIMES DAILY
Qty: 90 TABLET | Refills: 3 | Status: SHIPPED | OUTPATIENT
Start: 2024-11-15

## 2025-03-19 DIAGNOSIS — G25.81 RESTLESS LEG SYNDROME: ICD-10-CM

## 2025-03-19 RX ORDER — ROPINIROLE 0.5 MG/1
0.5 TABLET, FILM COATED ORAL 3 TIMES DAILY
Qty: 90 TABLET | Refills: 1 | Status: SHIPPED | OUTPATIENT
Start: 2025-03-19

## 2025-03-20 ENCOUNTER — HOSPITAL ENCOUNTER (EMERGENCY)
Age: 50
Discharge: HOME | End: 2025-03-20
Payer: COMMERCIAL

## 2025-03-20 VITALS
DIASTOLIC BLOOD PRESSURE: 91 MMHG | HEART RATE: 72 BPM | TEMPERATURE: 98 F | BODY MASS INDEX: 32.49 KG/M2 | WEIGHT: 207 LBS | SYSTOLIC BLOOD PRESSURE: 136 MMHG | OXYGEN SATURATION: 98 % | HEIGHT: 67 IN | RESPIRATION RATE: 20 BRPM

## 2025-03-20 DIAGNOSIS — J10.1 INFLUENZA A: Primary | ICD-10-CM

## 2025-03-20 LAB
FLUAV AG SPEC QL: POSITIVE
FLUBV AG SPEC QL: NEGATIVE

## 2025-03-20 PROCEDURE — 87804 INFLUENZA ASSAY W/OPTIC: CPT

## 2025-03-20 PROCEDURE — 99213 OFFICE O/P EST LOW 20 MIN: CPT

## 2025-03-20 RX ORDER — PREDNISONE 20 MG/1
20 TABLET ORAL 2 TIMES DAILY
Qty: 10 TABLET | Refills: 0 | Status: SHIPPED | OUTPATIENT
Start: 2025-03-20 | End: 2025-03-25

## 2025-03-20 RX ORDER — ACETAMINOPHEN 325 MG/1
650 TABLET ORAL EVERY 6 HOURS PRN
COMMUNITY

## 2025-03-20 RX ORDER — BENZONATATE 100 MG/1
100 CAPSULE ORAL 3 TIMES DAILY PRN
Qty: 21 CAPSULE | Refills: 0 | Status: SHIPPED | OUTPATIENT
Start: 2025-03-20 | End: 2025-03-27

## 2025-03-20 ASSESSMENT — PAIN - FUNCTIONAL ASSESSMENT: PAIN_FUNCTIONAL_ASSESSMENT: 0-10

## 2025-03-20 ASSESSMENT — PAIN DESCRIPTION - LOCATION: LOCATION: GENERALIZED

## 2025-03-20 ASSESSMENT — PAIN DESCRIPTION - DESCRIPTORS: DESCRIPTORS: ACHING

## 2025-03-20 ASSESSMENT — PAIN SCALES - GENERAL: PAINLEVEL_OUTOF10: 7

## 2025-03-20 ASSESSMENT — ENCOUNTER SYMPTOMS: COUGH: 1

## 2025-03-20 NOTE — ED PROVIDER NOTES
[facility] URGENT CARE  Urgent Care Encounter       CHIEF COMPLAINT       Chief Complaint   Patient presents with    Generalized Body Aches    Cough       Nurses Notes reviewed and I agree except as noted in the HPI.  HISTORY OF PRESENT ILLNESS   Izzy Hauser is a 49 y.o. female who presents with concerns of a cough, fever, fatigue, and generalized body aches that all started Monday. Patient reports she was recently on a cruise and when returned symptoms started.  Reports no medication for symptom management, states \"I have just been sleeping\".      HPI    REVIEW OF SYSTEMS     Review of Systems   Constitutional:  Positive for fatigue and fever.   HENT:  Positive for congestion.    Respiratory:  Positive for cough.    Musculoskeletal:  Positive for myalgias.   All other systems reviewed and are negative.      PAST MEDICAL HISTORY         Diagnosis Date    Arthritis     neck    Restless leg syndrome        SURGICALHISTORY     Patient  has a past surgical history that includes  section (/); Varicose vein surgery (2012); Tubal ligation (); Dilation and curettage of uterus (); Endometrial ablation; Breast enhancement surgery (Bilateral, 2022); and cervical fusion (N/A, 10/9/2024).    CURRENT MEDICATIONS       Discharge Medication List as of 3/20/2025  9:26 AM        CONTINUE these medications which have NOT CHANGED    Details   acetaminophen (TYLENOL) 325 MG tablet Take 2 tablets by mouth every 6 hours as needed for PainHistorical Med      rOPINIRole (REQUIP) 0.5 MG tablet Take 1 tablet by mouth 3 times daily, Disp-90 tablet, R-1Normal      senna-docusate (SENOKOT S) 8.6-50 MG per tablet Take 1 tablet by mouth daily as needed for Constipation, Disp-30 tablet, R-0Normal      traZODone (DESYREL) 50 MG tablet TAKE 1 TABLET BY MOUTH AT BEDTIMEHistorical Med             ALLERGIES     Patient is has no known allergies.    Patients   Immunization History   Administered Date(s)  Patient is agreeable with the above plan and denies questions or concerns at this time.        PATIENT REFERRED TO:  Jairo Domínguez, DO  [address], [address] / [place] [phone]      DISCHARGE MEDICATIONS:  Discharge Medication List as of 3/20/2025  9:26 AM        START taking these medications    Details   predniSONE (DELTASONE) 20 MG tablet Take 1 tablet by mouth 2 times daily for 5 days, Disp-10 tablet, R-0Normal      benzonatate (TESSALON PERLES) 100 MG capsule Take 1 capsule by mouth 3 times daily as needed for Cough, Disp-21 capsule, R-0Normal             Discharge Medication List as of 3/20/2025  9:26 AM          Discharge Medication List as of 3/20/2025  9:26 AM          SHIVAM Bar CNP    (Please note that portions of this note were completed with a voice recognition program. Efforts were made to edit the dictations but occasionally words are mis-transcribed.)            Darby Grijalva APRN - CNP  03/20/25 0928

## 2025-03-20 NOTE — ED NOTES
Pt with complaints of a cough, body aches, fever and chills that started on Monday. States tylenol has helped with symptoms.     Agustin Mckoy LPN  03/20/25 [id number]

## 2025-04-28 ENCOUNTER — COMMUNITY OUTREACH (OUTPATIENT)
Dept: FAMILY MEDICINE CLINIC | Age: 50
End: 2025-04-28

## 2025-05-16 DIAGNOSIS — G25.81 RESTLESS LEG SYNDROME: ICD-10-CM

## 2025-05-16 RX ORDER — ROPINIROLE 0.5 MG/1
0.5 TABLET, FILM COATED ORAL 3 TIMES DAILY
Qty: 90 TABLET | Refills: 1 | Status: SHIPPED | OUTPATIENT
Start: 2025-05-16

## 2025-08-11 DIAGNOSIS — G25.81 RESTLESS LEG SYNDROME: ICD-10-CM

## 2025-08-11 RX ORDER — ROPINIROLE 0.5 MG/1
0.5 TABLET, FILM COATED ORAL 3 TIMES DAILY
Qty: 90 TABLET | Refills: 0 | Status: SHIPPED | OUTPATIENT
Start: 2025-08-11

## 2025-09-03 DIAGNOSIS — G25.81 RESTLESS LEG SYNDROME: ICD-10-CM

## 2025-09-03 RX ORDER — ROPINIROLE 0.5 MG/1
0.5 TABLET, FILM COATED ORAL 3 TIMES DAILY
Qty: 90 TABLET | Refills: 0 | Status: SHIPPED | OUTPATIENT
Start: 2025-09-03

## (undated) DEVICE — DRAIN SURG FLAT W7MMXL20CM FULL PERF

## (undated) DEVICE — SUTURE VICRYL + SZ 3-0 L27IN ABSRB UD FS2 3/8 CIR REV CUT

## (undated) DEVICE — DRAPE MICSCP W132XL406CM LENS DIA68MM W VARI LENS2 FOR LEICA

## (undated) DEVICE — COLLAR CERV COT DIAL HT ADJ XL PT ACCS WIND VISTA

## (undated) DEVICE — DRESSING TRNSPAR W5XL4.5IN FLM SHT SEMIPERMEABLE WIND

## (undated) DEVICE — COLLAR CERV UNIV AD L13-19IN TRACH OPN TWO PC RIG POLYETH

## (undated) DEVICE — PAD OP RM W20XL72XH2IN PRECIS CUT FLAT EC BIOCLINIC

## (undated) DEVICE — PACK-MAJOR

## (undated) DEVICE — GOWN,SIRUS,NON REINFRCD,LARGE,SET IN SL: Brand: MEDLINE

## (undated) DEVICE — STRIP,CLOSURE,WOUND,MEDI-STRIP,1/2X4: Brand: MEDLINE

## (undated) DEVICE — Device: Brand: FABCO

## (undated) DEVICE — SPONGE,NEURO,0.5"X0.5",XR,STRL,10/PK: Brand: MEDLINE

## (undated) DEVICE — BUR CUT RND FLUT AGRSV 4.0MM

## (undated) DEVICE — MASTISOL ADHESIVE LIQ 2/3ML

## (undated) DEVICE — SUTURE NONABSORBABLE 3-0 12X18 IN PRE-CUT VICRYL VIO SUTUPAK VCP104G

## (undated) DEVICE — Device

## (undated) DEVICE — APPLICATOR MEDICATED 10.5 CC SOLUTION HI LT ORNG CHLORAPREP

## (undated) DEVICE — GLOVE SURG SZ 65 THK91MIL LTX FREE SYN POLYISOPRENE

## (undated) DEVICE — EVACUATOR SURG 100CC SIL BLB SUCT RESVR FOR CLS WND DRNGE

## (undated) DEVICE — ADMIRAL ACP DRILL, 12MM: Brand: ADMIRAL

## (undated) DEVICE — GLOVE SURG SZ 9 THK91MIL LTX FREE SYN POLYISOPRENE ANTI

## (undated) DEVICE — AGENT HEMOSTATIC SURGIFLOW MATRIX KIT W/THROMBIN

## (undated) DEVICE — SUTURE ETHILON SZ 3-0 L18IN NONABSORBABLE BLK L24MM FS-1 3/8 663G

## (undated) DEVICE — SUTURE MONOCRYL SZ 4-0 L27IN ABSRB UD L19MM PS-2 1/2 CIR PRIM Y426H